# Patient Record
Sex: MALE | Race: ASIAN | NOT HISPANIC OR LATINO | Employment: FULL TIME | URBAN - METROPOLITAN AREA
[De-identification: names, ages, dates, MRNs, and addresses within clinical notes are randomized per-mention and may not be internally consistent; named-entity substitution may affect disease eponyms.]

---

## 2022-06-23 ENCOUNTER — APPOINTMENT (OUTPATIENT)
Dept: CT IMAGING | Facility: HOSPITAL | Age: 32
End: 2022-06-23
Attending: EMERGENCY MEDICINE
Payer: COMMERCIAL

## 2022-06-23 ENCOUNTER — HOSPITAL ENCOUNTER (EMERGENCY)
Facility: HOSPITAL | Age: 32
Discharge: HOME | End: 2022-06-23
Attending: EMERGENCY MEDICINE | Admitting: EMERGENCY MEDICINE
Payer: COMMERCIAL

## 2022-06-23 VITALS
HEART RATE: 72 BPM | DIASTOLIC BLOOD PRESSURE: 105 MMHG | TEMPERATURE: 97.7 F | RESPIRATION RATE: 20 BRPM | SYSTOLIC BLOOD PRESSURE: 138 MMHG | HEIGHT: 62 IN | OXYGEN SATURATION: 98 % | BODY MASS INDEX: 33.13 KG/M2 | WEIGHT: 180 LBS

## 2022-06-23 DIAGNOSIS — K29.00 OTHER ACUTE GASTRITIS, PRESENCE OF BLEEDING UNSPECIFIED: ICD-10-CM

## 2022-06-23 LAB
ALBUMIN SERPL-MCNC: 5.6 G/DL (ref 3.5–5)
ALP SERPL-CCNC: 43 U/L (ref 45–120)
ALT SERPL W P-5'-P-CCNC: 112 U/L (ref 0–45)
ANION GAP SERPL CALCULATED.3IONS-SCNC: 12 MMOL/L (ref 5–18)
AST SERPL W P-5'-P-CCNC: 40 U/L (ref 0–40)
BASOPHILS # BLD AUTO: 0.1 10E3/UL (ref 0–0.2)
BASOPHILS NFR BLD AUTO: 0 %
BILIRUB SERPL-MCNC: 2.4 MG/DL (ref 0–1)
BUN SERPL-MCNC: 20 MG/DL (ref 8–22)
CALCIUM SERPL-MCNC: 10.9 MG/DL (ref 8.5–10.5)
CHLORIDE BLD-SCNC: 103 MMOL/L (ref 98–107)
CO2 SERPL-SCNC: 24 MMOL/L (ref 22–31)
CREAT SERPL-MCNC: 1.12 MG/DL (ref 0.7–1.3)
EOSINOPHIL # BLD AUTO: 0.1 10E3/UL (ref 0–0.7)
EOSINOPHIL NFR BLD AUTO: 0 %
ERYTHROCYTE [DISTWIDTH] IN BLOOD BY AUTOMATED COUNT: 12.2 % (ref 10–15)
GFR SERPL CREATININE-BSD FRML MDRD: 89 ML/MIN/1.73M2
GLUCOSE BLD-MCNC: 121 MG/DL (ref 70–125)
HCT VFR BLD AUTO: 57.1 % (ref 40–53)
HGB BLD-MCNC: 19.4 G/DL (ref 13.3–17.7)
IMM GRANULOCYTES # BLD: 0 10E3/UL
IMM GRANULOCYTES NFR BLD: 0 %
LIPASE SERPL-CCNC: 29 U/L (ref 0–52)
LYMPHOCYTES # BLD AUTO: 1.9 10E3/UL (ref 0.8–5.3)
LYMPHOCYTES NFR BLD AUTO: 14 %
MCH RBC QN AUTO: 29.8 PG (ref 26.5–33)
MCHC RBC AUTO-ENTMCNC: 34 G/DL (ref 31.5–36.5)
MCV RBC AUTO: 88 FL (ref 78–100)
MONOCYTES # BLD AUTO: 0.8 10E3/UL (ref 0–1.3)
MONOCYTES NFR BLD AUTO: 6 %
NEUTROPHILS # BLD AUTO: 10.9 10E3/UL (ref 1.6–8.3)
NEUTROPHILS NFR BLD AUTO: 80 %
NRBC # BLD AUTO: 0 10E3/UL
NRBC BLD AUTO-RTO: 0 /100
PLATELET # BLD AUTO: 217 10E3/UL (ref 150–450)
POTASSIUM BLD-SCNC: 4.1 MMOL/L (ref 3.5–5)
PROT SERPL-MCNC: 8.9 G/DL (ref 6–8)
RBC # BLD AUTO: 6.51 10E6/UL (ref 4.4–5.9)
SODIUM SERPL-SCNC: 139 MMOL/L (ref 136–145)
WBC # BLD AUTO: 13.7 10E3/UL (ref 4–11)

## 2022-06-23 PROCEDURE — 258N000003 HC RX IP 258 OP 636: Performed by: EMERGENCY MEDICINE

## 2022-06-23 PROCEDURE — 96361 HYDRATE IV INFUSION ADD-ON: CPT

## 2022-06-23 PROCEDURE — 96375 TX/PRO/DX INJ NEW DRUG ADDON: CPT

## 2022-06-23 PROCEDURE — 80053 COMPREHEN METABOLIC PANEL: CPT | Performed by: EMERGENCY MEDICINE

## 2022-06-23 PROCEDURE — 36415 COLL VENOUS BLD VENIPUNCTURE: CPT | Performed by: EMERGENCY MEDICINE

## 2022-06-23 PROCEDURE — 74176 CT ABD & PELVIS W/O CONTRAST: CPT

## 2022-06-23 PROCEDURE — 250N000011 HC RX IP 250 OP 636: Performed by: EMERGENCY MEDICINE

## 2022-06-23 PROCEDURE — 99285 EMERGENCY DEPT VISIT HI MDM: CPT | Mod: 25

## 2022-06-23 PROCEDURE — C9113 INJ PANTOPRAZOLE SODIUM, VIA: HCPCS | Performed by: EMERGENCY MEDICINE

## 2022-06-23 PROCEDURE — 96374 THER/PROPH/DIAG INJ IV PUSH: CPT | Mod: 59

## 2022-06-23 PROCEDURE — 83690 ASSAY OF LIPASE: CPT | Performed by: EMERGENCY MEDICINE

## 2022-06-23 PROCEDURE — 85025 COMPLETE CBC W/AUTO DIFF WBC: CPT | Performed by: EMERGENCY MEDICINE

## 2022-06-23 RX ORDER — ONDANSETRON 2 MG/ML
4 INJECTION INTRAMUSCULAR; INTRAVENOUS ONCE
Status: COMPLETED | OUTPATIENT
Start: 2022-06-23 | End: 2022-06-23

## 2022-06-23 RX ORDER — PANTOPRAZOLE SODIUM 40 MG/1
40 TABLET, DELAYED RELEASE ORAL DAILY
Qty: 30 TABLET | Refills: 0 | Status: SHIPPED | OUTPATIENT
Start: 2022-06-23 | End: 2022-07-23

## 2022-06-23 RX ORDER — ONDANSETRON 4 MG/1
4 TABLET, ORALLY DISINTEGRATING ORAL EVERY 8 HOURS PRN
Qty: 10 TABLET | Refills: 0 | Status: SHIPPED | OUTPATIENT
Start: 2022-06-23 | End: 2022-06-26

## 2022-06-23 RX ORDER — KETOROLAC TROMETHAMINE 15 MG/ML
15 INJECTION, SOLUTION INTRAMUSCULAR; INTRAVENOUS ONCE
Status: COMPLETED | OUTPATIENT
Start: 2022-06-23 | End: 2022-06-23

## 2022-06-23 RX ADMIN — SODIUM CHLORIDE 500 ML: 9 INJECTION, SOLUTION INTRAVENOUS at 04:53

## 2022-06-23 RX ADMIN — KETOROLAC TROMETHAMINE 15 MG: 15 INJECTION, SOLUTION INTRAMUSCULAR; INTRAVENOUS at 06:51

## 2022-06-23 RX ADMIN — ONDANSETRON 4 MG: 2 INJECTION INTRAMUSCULAR; INTRAVENOUS at 04:52

## 2022-06-23 RX ADMIN — PANTOPRAZOLE SODIUM 40 MG: 40 INJECTION, POWDER, FOR SOLUTION INTRAVENOUS at 04:53

## 2022-06-23 ASSESSMENT — ENCOUNTER SYMPTOMS
ABDOMINAL PAIN: 1
DIARRHEA: 0
DIFFICULTY URINATING: 0
VOMITING: 0
NAUSEA: 1
DYSURIA: 0
CONSTIPATION: 0
HEMATURIA: 0

## 2022-06-23 NOTE — ED PROVIDER NOTES
EMERGENCY DEPARTMENT ENCOUNTER      NAME: Anastacio Li  AGE: 31 year old male  YOB: 1990  MRN: [id number]  EVALUATION DATE & TIME: 2022  4:27 AM    PCP: No primary care provider on file.    ED PROVIDER: Apurva Fuller M.D.      No chief complaint on file.        FINAL IMPRESSION:  1. Other acute gastritis, presence of bleeding unspecified        MEDICAL DECISION MAKIN:51 AM I met with the patient, obtained history, performed an initial exam, and discussed options and plan for diagnostics and treatment here in the ED.   Pertinent Labs & Imaging studies reviewed. (See chart for details)     Anastacio Li is a 31 year old male who presents with left upper abdominal pain.  Pain wraps around into the back to include the left flank.  Belly is otherwise soft and there is no tenderness on the right side or in the right lower quadrant.  He has had no vomiting.  He is afebrile and aside from some diastolic hypertension, vitals are normal.  Differential diagnosis includes gastritis, viral gastroenteritis, renal colic or pancreatitis.  Suspicion for bacterial process such as cholecystitis or appendicitis as he is not tender in the right side of his abdomen.  He will get medications for pain, fluids and acid blocking medications through the IV.  I will get labs and he will be sent for CT scan to evaluate.    CT scan does not show any evidence for bacterial infection, obstruction or hydronephrosis or ureteral stone.  Labs returned showing a slight elevation in his ALT but no other abnormalities.  CT scan does show fatty liver disease which is likely the cause of this ALT elevation.  The patient was feeling better after the medications given.   He will be discharged with Protonix and Zofran to help manage his symptoms.  We discussed warning signs and indications to return to the emergency department.  He understands these warning signs and will return with any concerns.      PPE worn: n95 mask, goggles.       MEDICATIONS GIVEN IN THE EMERGENCY:  Medications   ondansetron (ZOFRAN) injection 4 mg (4 mg Intravenous Given 6/23/22 0452)   pantoprazole (PROTONIX) IV push injection 40 mg (40 mg Intravenous Given 6/23/22 0453)   0.9% sodium chloride BOLUS (500 mLs Intravenous New Bag 6/23/22 0453)       NEW PRESCRIPTIONS STARTED AT TODAY'S ER VISIT:  New Prescriptions    ONDANSETRON (ZOFRAN ODT) 4 MG ODT TAB    Take 1 tablet (4 mg) by mouth every 8 hours as needed for nausea or vomiting    PANTOPRAZOLE (PROTONIX) 40 MG EC TABLET    Take 1 tablet (40 mg) by mouth daily for 30 doses          =================================================================    HPI    Patient information was obtained from: patient     Use of : Use of : N/A       Anastacio Li is a 31 year old male with no pertinent history on record who presents with abdominal pain.     Patient reports LUQ abdominal pain which began on 6/21 around noon. His pain severely worsened this evening. Patient states his pain comes in intermittent episodes of stabbing pain in his LUQ. Patient presents to the ED after he had 4 episodes of this stabbing pain within the 30 minutes. States the episodes of pain are waking him up from sleep. Associating nausea without vomiting. Also complains of back pain. Reports a history of gallstones, no cholecystectomy. No history of kidney stones. Denies dysuria, hematuria, or difficulty urinating. No changes to his pain with voiding or stooling. No diarrhea. No other complaints or concerns expressed at this time.    REVIEW OF SYSTEMS   Review of Systems   Gastrointestinal: Positive for abdominal pain and nausea. Negative for constipation, diarrhea and vomiting.   Genitourinary: Negative for difficulty urinating, dysuria and hematuria.   All other systems reviewed and are negative.       PAST MEDICAL HISTORY:  History reviewed. No pertinent past medical history.    PAST SURGICAL HISTORY:  History reviewed. No pertinent  "surgical history.    CURRENT MEDICATIONS:    No current facility-administered medications for this encounter.  No current outpatient medications on file.    ALLERGIES:  No Known Allergies    FAMILY HISTORY:  History reviewed. No pertinent family history.    SOCIAL HISTORY:         PHYSICAL EXAM:    Vitals: BP (!) 143/108   Pulse 89   Temp 97.7  F (36.5  C) (Oral)   Resp 20   Ht 1.575 m (5' 2\")   Wt 81.6 kg (180 lb)   SpO2 98%   BMI 32.92 kg/m     General:. Alert and interactive, comfortable appearing.  HENT: Oropharynx without erythema or exudates. MMM.  TMs clear bilaterally.  Eyes: Pupils mid-sized and equally reactive.   Neck: Full AROM.  No midline tenderness to palpation.  Cardiovascular: Regular rate and rhythm. Peripheral pulses 2+ bilaterally.  Chest/Pulmonary: Normal work of breathing. Lung sounds clear and equal throughout, no wheezes or crackles. No chest wall tenderness or deformities.  Abdomen: Soft, nondistended. LUQ tenderness, no RLQ tenderness. No guarding or rebound.  Back/Spine: Left CVA tenderness around to the left upper quadrant.  No midline tenderness  Extremities: Normal ROM of all major joints. No lower extremity edema.   Skin: Warm and dry. Normal skin color.   Neuro: Speech clear. CNs grossly intact. Moves all extremities appropriately. Strength and sensation grossly intact to all extremities.   Psych: Normal affect/mood, cooperative, memory appropriate.     LAB:  All pertinent labs reviewed and interpreted.  Labs Ordered and Resulted from Time of ED Arrival to Time of ED Departure   COMPREHENSIVE METABOLIC PANEL - Abnormal       Result Value    Sodium 139      Potassium 4.1      Chloride 103      Carbon Dioxide (CO2) 24      Anion Gap 12      Urea Nitrogen 20      Creatinine 1.12      Calcium 10.9 (*)     Glucose 121      Alkaline Phosphatase 43 (*)     AST 40       (*)     Protein Total 8.9 (*)     Albumin 5.6 (*)     Bilirubin Total 2.4 (*)     GFR Estimate 89     CBC WITH " PLATELETS AND DIFFERENTIAL - Abnormal    WBC Count 13.7 (*)     RBC Count 6.51 (*)     Hemoglobin 19.4 (*)     Hematocrit 57.1 (*)     MCV 88      MCH 29.8      MCHC 34.0      RDW 12.2      Platelet Count 217      % Neutrophils 80      % Lymphocytes 14      % Monocytes 6      % Eosinophils 0      % Basophils 0      % Immature Granulocytes 0      NRBCs per 100 WBC 0      Absolute Neutrophils 10.9 (*)     Absolute Lymphocytes 1.9      Absolute Monocytes 0.8      Absolute Eosinophils 0.1      Absolute Basophils 0.1      Absolute Immature Granulocytes 0.0      Absolute NRBCs 0.0     LIPASE - Normal    Lipase 29         RADIOLOGY:  Abd/pelvis CT no contrast - Stone Protocol   Final Result   IMPRESSION:    1.  No urinary tract calculi or obstruction. Mild prostatomegaly.      2.  Diffusely fatty infiltrated moderately enlarged liver with focal sparing adjacent to the gallbladder fossa.      3.  Slight left convex lower lumbar curve. Partial sacralization of L5 on the left.                     I, Jeancarlos Eller, am serving as a scribe to document services personally performed by Dr. Apurva Fuller  based on my observation and the provider's statements to me. I, Apurva Fuller MD attest that Jeancarlos Eller is acting in a scribe capacity, has observed my performance of the services and has documented them in accordance with my direction.      Apurva Fuller M.D.  Emergency Medicine  [facility] EMERGENCY DEPARTMENT  [facility] [facility] [address]  [phone]  Dept: [phone]         Apurva Fuller MD  06/23/22 0824

## 2022-06-23 NOTE — ED TRIAGE NOTES
Pt here d/t intermittent ABD pain that started yesterday afternoon while at work. No trauma. Nausea w/o vomit, some diarrhea. LUQ feels better when pushing on it. Pt also c/o pain to bilat lower ABD. Normal voiding. States he has had this pain before and was diagnosed with gastroenteritis.      Triage Assessment     Row Name 06/23/22 0420       Triage Assessment (Adult)    Airway WDL WDL       Respiratory WDL    Respiratory WDL WDL       Cognitive/Neuro/Behavioral WDL    Cognitive/Neuro/Behavioral WDL WDL

## 2022-06-23 NOTE — DISCHARGE INSTRUCTIONS
Use 40 mg Protonix every morning before breakfast to help reduce the acid in your stomach.  Avoid acid producing medications such as ibuprofen and acid producing foods such as citrus fruits, berries, coffee, chocolate, and tomatoes/peppers.    Use Zofran 4 mg every 8 hours as needed for nausea/vomiting.    Start by ingesting only clear liquids such as water, Gatorade, Pedialyte, apple juice, etc.  Once you are able to tolerate these without pain you may advance your diet to gentle soft such as white toast, rice, mashed potatoes, oatmeal, bananas, applesauce, etc.  Once you are able to eat these without pain or nausea, you may advance your diet back to normal.    If you develop a fever greater than 100.5 degrees, vomiting and are unable to tolerate oral hydration, blood in your stool or blood in your vomitus, or pain that is not controlled with prescription medications, please return to the emergency department.

## 2025-05-21 ENCOUNTER — HOSPITAL ENCOUNTER (EMERGENCY)
Facility: CLINIC | Age: 35
Discharge: HOME | End: 2025-05-21
Admitting: PHYSICIAN ASSISTANT
Payer: COMMERCIAL

## 2025-05-21 ENCOUNTER — NURSE TRIAGE (OUTPATIENT)
Dept: NURSING | Facility: CLINIC | Age: 35
End: 2025-05-21
Payer: COMMERCIAL

## 2025-05-21 VITALS
TEMPERATURE: 97.7 F | OXYGEN SATURATION: 99 % | SYSTOLIC BLOOD PRESSURE: 163 MMHG | BODY MASS INDEX: 31.24 KG/M2 | WEIGHT: 169.75 LBS | RESPIRATION RATE: 20 BRPM | DIASTOLIC BLOOD PRESSURE: 103 MMHG | HEART RATE: 56 BPM | HEIGHT: 62 IN

## 2025-05-21 DIAGNOSIS — K29.70 GASTRITIS: ICD-10-CM

## 2025-05-21 DIAGNOSIS — R10.13 ABDOMINAL PAIN, EPIGASTRIC: ICD-10-CM

## 2025-05-21 LAB
ALBUMIN SERPL BCG-MCNC: 4.9 G/DL (ref 3.5–5.2)
ALP SERPL-CCNC: 37 U/L (ref 40–150)
ALT SERPL W P-5'-P-CCNC: 74 U/L (ref 0–70)
ANION GAP SERPL CALCULATED.3IONS-SCNC: 11 MMOL/L (ref 7–15)
AST SERPL W P-5'-P-CCNC: 36 U/L (ref 0–45)
ATRIAL RATE - MUSE: 51 BPM
BASOPHILS # BLD AUTO: 0 10E3/UL (ref 0–0.2)
BASOPHILS NFR BLD AUTO: 0 %
BILIRUB SERPL-MCNC: 1.1 MG/DL
BILIRUBIN DIRECT (ROCHE PRO & PURE): 0.44 MG/DL (ref 0–0.45)
BUN SERPL-MCNC: 15.1 MG/DL (ref 6–20)
CALCIUM SERPL-MCNC: 10 MG/DL (ref 8.8–10.4)
CHLORIDE SERPL-SCNC: 104 MMOL/L (ref 98–107)
CREAT SERPL-MCNC: 1.22 MG/DL (ref 0.67–1.17)
DIASTOLIC BLOOD PRESSURE - MUSE: NORMAL MMHG
EGFRCR SERPLBLD CKD-EPI 2021: 79 ML/MIN/1.73M2
EOSINOPHIL # BLD AUTO: 0.1 10E3/UL (ref 0–0.7)
EOSINOPHIL NFR BLD AUTO: 1 %
ERYTHROCYTE [DISTWIDTH] IN BLOOD BY AUTOMATED COUNT: 12.2 % (ref 10–15)
GLUCOSE SERPL-MCNC: 97 MG/DL (ref 70–99)
HCO3 SERPL-SCNC: 26 MMOL/L (ref 22–29)
HCT VFR BLD AUTO: 46.4 % (ref 40–53)
HGB BLD-MCNC: 15.4 G/DL (ref 13.3–17.7)
IMM GRANULOCYTES # BLD: 0 10E3/UL
IMM GRANULOCYTES NFR BLD: 0 %
INTERPRETATION ECG - MUSE: NORMAL
LIPASE SERPL-CCNC: 57 U/L (ref 13–60)
LYMPHOCYTES # BLD AUTO: 2.5 10E3/UL (ref 0.8–5.3)
LYMPHOCYTES NFR BLD AUTO: 33 %
MAGNESIUM SERPL-MCNC: 2.3 MG/DL (ref 1.7–2.3)
MCH RBC QN AUTO: 29.9 PG (ref 26.5–33)
MCHC RBC AUTO-ENTMCNC: 33.2 G/DL (ref 31.5–36.5)
MCV RBC AUTO: 90 FL (ref 78–100)
MONOCYTES # BLD AUTO: 0.5 10E3/UL (ref 0–1.3)
MONOCYTES NFR BLD AUTO: 6 %
NEUTROPHILS # BLD AUTO: 4.5 10E3/UL (ref 1.6–8.3)
NEUTROPHILS NFR BLD AUTO: 60 %
NRBC # BLD AUTO: 0 10E3/UL
NRBC BLD AUTO-RTO: 0 /100
P AXIS - MUSE: 27 DEGREES
PLATELET # BLD AUTO: 188 10E3/UL (ref 150–450)
POTASSIUM SERPL-SCNC: 4.1 MMOL/L (ref 3.4–5.3)
PR INTERVAL - MUSE: 186 MS
PROT SERPL-MCNC: 7.5 G/DL (ref 6.4–8.3)
QRS DURATION - MUSE: 100 MS
QT - MUSE: 418 MS
QTC - MUSE: 385 MS
R AXIS - MUSE: 54 DEGREES
RBC # BLD AUTO: 5.15 10E6/UL (ref 4.4–5.9)
SODIUM SERPL-SCNC: 141 MMOL/L (ref 135–145)
SYSTOLIC BLOOD PRESSURE - MUSE: NORMAL MMHG
T AXIS - MUSE: 33 DEGREES
VENTRICULAR RATE- MUSE: 51 BPM
WBC # BLD AUTO: 7.6 10E3/UL (ref 4–11)

## 2025-05-21 PROCEDURE — 85025 COMPLETE CBC W/AUTO DIFF WBC: CPT | Performed by: PHYSICIAN ASSISTANT

## 2025-05-21 PROCEDURE — 99284 EMERGENCY DEPT VISIT MOD MDM: CPT

## 2025-05-21 PROCEDURE — 36415 COLL VENOUS BLD VENIPUNCTURE: CPT | Performed by: PHYSICIAN ASSISTANT

## 2025-05-21 PROCEDURE — 93005 ELECTROCARDIOGRAM TRACING: CPT | Performed by: PHYSICIAN ASSISTANT

## 2025-05-21 PROCEDURE — 82248 BILIRUBIN DIRECT: CPT | Performed by: PHYSICIAN ASSISTANT

## 2025-05-21 PROCEDURE — 82310 ASSAY OF CALCIUM: CPT | Performed by: PHYSICIAN ASSISTANT

## 2025-05-21 PROCEDURE — 83735 ASSAY OF MAGNESIUM: CPT | Performed by: PHYSICIAN ASSISTANT

## 2025-05-21 PROCEDURE — 82977 ASSAY OF GGT: CPT | Performed by: PHYSICIAN ASSISTANT

## 2025-05-21 PROCEDURE — 83690 ASSAY OF LIPASE: CPT | Performed by: PHYSICIAN ASSISTANT

## 2025-05-21 ASSESSMENT — COLUMBIA-SUICIDE SEVERITY RATING SCALE - C-SSRS
2. HAVE YOU ACTUALLY HAD ANY THOUGHTS OF KILLING YOURSELF IN THE PAST MONTH?: NO
1. IN THE PAST MONTH, HAVE YOU WISHED YOU WERE DEAD OR WISHED YOU COULD GO TO SLEEP AND NOT WAKE UP?: NO
6. HAVE YOU EVER DONE ANYTHING, STARTED TO DO ANYTHING, OR PREPARED TO DO ANYTHING TO END YOUR LIFE?: NO

## 2025-05-21 NOTE — TELEPHONE ENCOUNTER
Nurse Triage SBAR    Situation: Abdominal pain.     Background: Patient calling. Pt is calling to schedule an appt for his provider. He states he is having reoccurring abdominal issues. Last week his pain was severe.     Assessment: He gets stomach pain from time to time. Usually the pain is sharp. Last Thursday the pain started up again to the point of effecting his ability to work. Occasional nausea. His pain is currently 3-4/10, earlier the pain was about 6-7/10. Since noon the pain has been constantly present. The pain is in his upper left abdomen.     Protocol Recommended Disposition: Emergency Department    Recommendation: According to the protocol, Patient should go to the ED now. Advised Patient that the patient needs to go to the ED now. Care advice given. Patient verbalizes understanding and agrees with plan of care. Reviewed concerning symptoms and when to call 911.    Cynthia Ly RN Nursing Advisor 5/21/2025 6:14 PM     Reason for Disposition   [1] Pain lasts > 10 minutes AND [2] age > 35 AND [3] at least one cardiac risk factor (e.g., diabetes, high cholesterol, hypertension, obesity, smoker or strong family history of heart disease)    Additional Information   Negative: SEVERE difficulty breathing (e.g., struggling for each breath, speaks in single words)   Negative: Shock suspected (e.g., cold/pale/clammy skin, too weak to stand, low BP, rapid pulse)   Negative: Difficult to awaken or acting confused (e.g., disoriented, slurred speech)   Negative: Passed out (i.e., lost consciousness, collapsed and was not responding)   Negative: Visible sweat on face or sweat dripping down face   Negative: Sounds like a life-threatening emergency to the triager   Negative: Followed an abdomen (stomach) injury   Negative: Chest pain   Negative: [1] Abdominal pain AND [2] pregnant < 20 weeks   Negative: [1] Abdominal pain AND [2] pregnant 20 or more weeks   Negative: [1] Abdominal pain AND [2] postpartum (from 0 to 6  weeks after delivery)   Negative: Abdomen bloating or swelling are main symptoms   Negative: [1] SEVERE pain (e.g., excruciating) AND [2] present > 1 hour   Negative: [1] Pain lasts > 10 minutes AND [2] age > 50   Negative: [1] Pain lasts > 10 minutes AND [2] age > 40 AND [3] associated chest, arm, neck, upper back or jaw pain    Protocols used: Abdominal Pain - Upper-A-AH     3

## 2025-05-22 LAB — GGT SERPL-CCNC: 61 U/L (ref 8–61)

## 2025-05-22 NOTE — ED PROVIDER NOTES
EMERGENCY DEPARTMENT ENCOUNTER      NAME: Anastacio Li  AGE: 34 year old male  YOB: 1990  MRN: [id number]  EVALUATION DATE & TIME: No admission date for patient encounter.    PCP: Madison Harden    ED PROVIDER: Tramaine Caceres PA-C      Chief Complaint   Patient presents with    Abdominal Pain         FINAL IMPRESSION:  1. Gastritis    2. Abdominal pain, epigastric          ED COURSE & MEDICAL DECISION MAKING:    Pertinent Labs & Imaging studies reviewed. (See chart for details)  7:26 PM I met the patient and performed my initial interview and exam.   8:38 PM patient updated on laboratory results here, plan for discharge    34 year old male presents to the Emergency Department for evaluation of epigastric abdominal pain.     ED Course as of 05/21/25 2039   Wed May 21, 2025   1939 Patient is a 34-year-old male, no significant past medical history, presents emergency department for evaluation of worsening chronic abdominal pain.  Patient reports a chronic history of upper abdominal pain that intermittently flares up.  He reports some sharp sensation in his abdomen, as well as some burning pain.  He tried to call his primary care doctor and was directed into the emergency department for further assessment.  Vitally stable on arrival here, not febrile, tachycardic or tachypneic.  On exam, he has mild midepigastric abdominal discomfort, no CVA tenderness.  He denies significant alcohol intake.  He denies chest pain or shortness of breath.  Does report a family history of cardiac problems, was told by the nurse triage line he needs to be evaluated for cardiac etiology, however denies any chest pain or shortness of breath here.  He is PERC negative.  Laboratory workup initiated including CBC, BMP, hepatic, lipase, magnesium.  Differential includes cholecystitis, choledocholithiasis, pancreatitis, gastritis, less likely atypical ACS.  Will obtain EKG and basic labs here for assessment.   2035     Patient  "seen and reevaluated here in the emergency department, updated on laboratory studies, no evidence of acute abnormalities.  Patient will be discharged at this time, suspect some gastritis, recommended to continue to take omeprazole at home, will follow-up with his primary care doctor for likely GI referral.       Medical Decision Making  I reviewed the EMR: Outpatient Record: Outpatient nurse triage line call from today, outpatient ED visit on 06/23/2022  Discharge. No recommendations on prescription strength medication(s). N/A.    MIPS (CTPE, Dental pain, Hager, Sinusitis, Asthma/COPD, Head Trauma): Not Applicable    SEPSIS: None        At the conclusion of the encounter I discussed the results of all of the tests and the disposition. The questions were answered. The patient or family acknowledged understanding and was agreeable with the care plan.       0 minutes of critical care time     MEDICATIONS GIVEN IN THE EMERGENCY:  Medications - No data to display    NEW PRESCRIPTIONS STARTED AT TODAY'S ER VISIT  New Prescriptions    No medications on file          =================================================================    HPI    Patient information was obtained from: patient    Use of : N/A        Anastacio Li is a 34 year old male with no pertinent history on record who presents to this ED by private vehicle for evaluation of abdominal pain.    Patient reports chronic intermittent upper abdominal pain since grade school, that he describes as \"needles\", and that has been getting worse for the past few years. He states he has been experiencing frequent episodes since 5/15, that went away for a few days and returned today. He tried to make an appointment with his PCP but was told to call nurse triage line. After calling triage, he was recommended to go to the ED for further evaluation due to family history of heart conditions. Patient denies chest pain and does not endorse any heart conditions himself. " States he deals with GERD. Denies vomiting, diarrhea, fever. Only drinks alcohol occasionally. There were no other concerns/complaints at this time.     PAST MEDICAL HISTORY:  No past medical history on file.    PAST SURGICAL HISTORY:  No past surgical history on file.        CURRENT MEDICATIONS:    traZODone (DESYREL) 50 MG tablet         ALLERGIES:  No Known Allergies    FAMILY HISTORY:  Family History   Problem Relation Age of Onset    Schizophrenia Mother     Hypertension Father     Gout Father     Cerebrovascular Disease Father         passed from CVA in 50's    Gout Brother     Gout Brother        SOCIAL HISTORY:   Social History     Socioeconomic History    Marital status: Single   Tobacco Use    Smoking status: Never     Passive exposure: Never    Smokeless tobacco: Never   Vaping Use    Vaping status: Never Used   Substance and Sexual Activity    Alcohol use: Yes     Comment: 2-3 times a week.    Drug use: Yes     Types: Marijuana     Comment: edibles    Sexual activity: Not Currently     Social Drivers of Health     Financial Resource Strain: Low Risk  (4/18/2025)    Financial Resource Strain     Within the past 12 months, have you or your family members you live with been unable to get utilities (heat, electricity) when it was really needed?: No   Food Insecurity: Low Risk  (4/18/2025)    Food Insecurity     Within the past 12 months, did you worry that your food would run out before you got money to buy more?: No     Within the past 12 months, did the food you bought just not last and you didn t have money to get more?: No   Transportation Needs: Low Risk  (4/18/2025)    Transportation Needs     Within the past 12 months, has lack of transportation kept you from medical appointments, getting your medicines, non-medical meetings or appointments, work, or from getting things that you need?: No   Physical Activity: Unknown (4/18/2025)    Exercise Vital Sign     Days of Exercise per Week: 3 days   Stress:  "Stress Concern Present (4/18/2025)    [employer] - Occupational Stress Questionnaire     Feeling of Stress : Rather much   Social Connections: Unknown (4/18/2025)    Social Connection and Isolation Panel [NHANES]     Frequency of Social Gatherings with Friends and Family: Once a week   Interpersonal Safety: Low Risk  (4/18/2025)    Interpersonal Safety     Do you feel physically and emotionally safe where you currently live?: Yes     Within the past 12 months, have you been hit, slapped, kicked or otherwise physically hurt by someone?: No     Within the past 12 months, have you been humiliated or emotionally abused in other ways by your partner or ex-partner?: No   Housing Stability: Low Risk  (4/18/2025)    Housing Stability     Do you have housing? : Yes     Are you worried about losing your housing?: No       VITALS:  BP (!) 163/103   Pulse 56   Temp 97.7  F (36.5  C) (Temporal)   Resp 20   Ht 1.575 m (5' 2\")   Wt 77 kg (169 lb 12.1 oz)   SpO2 99%   BMI 31.05 kg/m      PHYSICAL EXAM    Physical Exam  Vitals and nursing note reviewed.   Constitutional:       General: He is not in acute distress.     Appearance: Normal appearance. He is normal weight. He is not toxic-appearing or diaphoretic.   HENT:      Right Ear: External ear normal.      Left Ear: External ear normal.   Eyes:      Conjunctiva/sclera: Conjunctivae normal.   Cardiovascular:      Rate and Rhythm: Normal rate and regular rhythm.      Heart sounds: Normal heart sounds.   Pulmonary:      Effort: Pulmonary effort is normal.   Abdominal:      General: There is no distension.      Palpations: Abdomen is soft.      Tenderness: There is no abdominal tenderness.      Comments: Minimal abdominal discomfort to the upper quadrant, epigastric area, no specific focal area of tenderness.   Neurological:      General: No focal deficit present.      Mental Status: He is alert. Mental status is at baseline.           LAB:  All " pertinent labs reviewed and interpreted.  Labs Ordered and Resulted from Time of ED Arrival to Time of ED Departure   BASIC METABOLIC PANEL - Abnormal       Result Value    Sodium 141      Potassium 4.1      Chloride 104      Carbon Dioxide (CO2) 26      Anion Gap 11      Urea Nitrogen 15.1      Creatinine 1.22 (*)     GFR Estimate 79      Calcium 10.0      Glucose 97     HEPATIC FUNCTION PANEL - Abnormal    Protein Total 7.5      Albumin 4.9      Bilirubin Total 1.1      Alkaline Phosphatase 37 (*)     AST 36      ALT 74 (*)     Bilirubin Direct 0.44     LIPASE - Normal    Lipase 57     MAGNESIUM - Normal    Magnesium 2.3     CBC WITH PLATELETS AND DIFFERENTIAL    WBC Count 7.6      RBC Count 5.15      Hemoglobin 15.4      Hematocrit 46.4      MCV 90      MCH 29.9      MCHC 33.2      RDW 12.2      Platelet Count 188      % Neutrophils 60      % Lymphocytes 33      % Monocytes 6      % Eosinophils 1      % Basophils 0      % Immature Granulocytes 0      NRBCs per 100 WBC 0      Absolute Neutrophils 4.5      Absolute Lymphocytes 2.5      Absolute Monocytes 0.5      Absolute Eosinophils 0.1      Absolute Basophils 0.0      Absolute Immature Granulocytes 0.0      Absolute NRBCs 0.0     GGT       RADIOLOGY:  Reviewed all pertinent imaging. Please see official radiology report.  No orders to display       EKG:    Performed at: 1937    Impression: Sinus bradycardia    Rate: 51  Rhythm: Sinus   Axis: 27 54 33  [place] Interval: 186  QRS Interval: 100  QTc Interval: 385  ST Changes: No ST elevation or depression  Comparison: No previous    I have reviewed and interpreted the EKG(s) documented above along with Dr. Villaseñor, ED MD.    PROCEDURES:   None.       ISandy, am serving as a scribe to document services personally performed by Tramaine Caceres PA-C, based on my observation and the provider's statements to me. ITramaine PA-C, attest that Sandy Allen is acting in a scribe capacity, has observed my  performance of the services and has documented them in accordance with my direction.    Tramaine Caceres PA-C  Emergency Medicine  [facility] EMERGENCY ROOM  [address] [facility] [address]  [phone]  Dept: [phone]       Tramaine Caceres PA-C  05/21/25 2035

## 2025-05-22 NOTE — DISCHARGE INSTRUCTIONS
You are seen here in the emergency department for evaluation of midepigastric abdominal pain, your laboratory studies do not show any evidence of intra-abdominal infection, electrolyte abnormality, or liver dysfunction.  I suspect this is likely secondary to some acid reflux.  I would recommend starting Prilosec or famotidine over-the-counter for the next 14 days, follow-up with your primary care doctor for further assessment.  Your EKG here does not show any evidence of cardiac abnormalities.

## 2025-05-22 NOTE — ED TRIAGE NOTES
Patient presents to the ED complaining of chronic abdominal pain since grade school.  He states he called he tried to make an appointment with his primary but was told he needs to come to the ED for a cardiac workup due to family history of diabetes and high blood pressure.  He denies chest pain and shortness of breath.  Abdominal pain is 3/10 at this time.  Denies nausea and vomiting.     Triage Assessment (Adult)       Row Name 05/21/25 3684          Triage Assessment    Airway WDL WDL        Respiratory WDL    Respiratory WDL WDL        Skin Circulation/Temperature WDL    Skin Circulation/Temperature WDL WDL        Cardiac WDL    Cardiac WDL WDL        Peripheral/Neurovascular WDL    Peripheral Neurovascular WDL WDL        Cognitive/Neuro/Behavioral WDL    Cognitive/Neuro/Behavioral WDL WDL        [place] Coma Scale    Best Eye Response 4-->(E4) spontaneous     Best Motor Response 6-->(M6) obeys commands     Best Verbal Response 5-->(V5) oriented     [place] Coma Scale Score 15     Assessment Qualifiers patient not sedated/intubated;no eye obstruction present

## 2025-05-31 ENCOUNTER — APPOINTMENT (OUTPATIENT)
Dept: ULTRASOUND IMAGING | Facility: CLINIC | Age: 35
End: 2025-05-31
Attending: EMERGENCY MEDICINE
Payer: COMMERCIAL

## 2025-05-31 ENCOUNTER — HOSPITAL ENCOUNTER (OUTPATIENT)
Facility: CLINIC | Age: 35
Setting detail: OBSERVATION
Discharge: HOME | End: 2025-06-01
Attending: EMERGENCY MEDICINE | Admitting: FAMILY MEDICINE
Payer: COMMERCIAL

## 2025-05-31 ENCOUNTER — APPOINTMENT (OUTPATIENT)
Dept: CT IMAGING | Facility: CLINIC | Age: 35
End: 2025-05-31
Attending: EMERGENCY MEDICINE
Payer: COMMERCIAL

## 2025-05-31 DIAGNOSIS — K81.0 ACUTE CHOLECYSTITIS: Primary | ICD-10-CM

## 2025-05-31 PROBLEM — R73.03 PREDIABETES: Status: ACTIVE | Noted: 2025-05-31

## 2025-05-31 PROBLEM — G47.00 INSOMNIA: Status: ACTIVE | Noted: 2025-05-31

## 2025-05-31 PROBLEM — R73.03 PREDIABETES: Status: ACTIVE | Noted: 2025-04-01

## 2025-05-31 PROBLEM — K21.9 GERD (GASTROESOPHAGEAL REFLUX DISEASE): Status: ACTIVE | Noted: 2025-05-31

## 2025-05-31 LAB
ALBUMIN SERPL BCG-MCNC: 5.2 G/DL (ref 3.5–5.2)
ALBUMIN UR-MCNC: NEGATIVE MG/DL
ALP SERPL-CCNC: 41 U/L (ref 40–150)
ALT SERPL W P-5'-P-CCNC: 77 U/L (ref 0–70)
ANION GAP SERPL CALCULATED.3IONS-SCNC: 15 MMOL/L (ref 7–15)
APPEARANCE UR: CLEAR
AST SERPL W P-5'-P-CCNC: 37 U/L (ref 0–45)
BASOPHILS # BLD AUTO: 0 10E3/UL (ref 0–0.2)
BASOPHILS NFR BLD AUTO: 0 %
BILIRUB SERPL-MCNC: 1 MG/DL
BILIRUB UR QL STRIP: NEGATIVE
BILIRUBIN DIRECT (ROCHE PRO & PURE): 0.26 MG/DL (ref 0–0.45)
BUN SERPL-MCNC: 17.5 MG/DL (ref 6–20)
CALCIUM SERPL-MCNC: 10.5 MG/DL (ref 8.8–10.4)
CHLORIDE SERPL-SCNC: 102 MMOL/L (ref 98–107)
COLOR UR AUTO: COLORLESS
CREAT SERPL-MCNC: 1.12 MG/DL (ref 0.67–1.17)
EGFRCR SERPLBLD CKD-EPI 2021: 87 ML/MIN/1.73M2
EOSINOPHIL # BLD AUTO: 0 10E3/UL (ref 0–0.7)
EOSINOPHIL NFR BLD AUTO: 0 %
ERYTHROCYTE [DISTWIDTH] IN BLOOD BY AUTOMATED COUNT: 12.6 % (ref 10–15)
GLUCOSE SERPL-MCNC: 130 MG/DL (ref 70–99)
GLUCOSE UR STRIP-MCNC: NEGATIVE MG/DL
HCO3 SERPL-SCNC: 26 MMOL/L (ref 22–29)
HCT VFR BLD AUTO: 51.3 % (ref 40–53)
HGB BLD-MCNC: 17.1 G/DL (ref 13.3–17.7)
HGB UR QL STRIP: NEGATIVE
IMM GRANULOCYTES # BLD: 0 10E3/UL
IMM GRANULOCYTES NFR BLD: 0 %
KETONES UR STRIP-MCNC: 5 MG/DL
LACTATE SERPL-SCNC: 1.7 MMOL/L (ref 0.7–2)
LEUKOCYTE ESTERASE UR QL STRIP: NEGATIVE
LIPASE SERPL-CCNC: 60 U/L (ref 13–60)
LYMPHOCYTES # BLD AUTO: 1.6 10E3/UL (ref 0.8–5.3)
LYMPHOCYTES NFR BLD AUTO: 16 %
MCH RBC QN AUTO: 29.4 PG (ref 26.5–33)
MCHC RBC AUTO-ENTMCNC: 33.3 G/DL (ref 31.5–36.5)
MCV RBC AUTO: 88 FL (ref 78–100)
MONOCYTES # BLD AUTO: 0.4 10E3/UL (ref 0–1.3)
MONOCYTES NFR BLD AUTO: 4 %
NEUTROPHILS # BLD AUTO: 8.1 10E3/UL (ref 1.6–8.3)
NEUTROPHILS NFR BLD AUTO: 80 %
NITRATE UR QL: NEGATIVE
NRBC # BLD AUTO: 0 10E3/UL
NRBC BLD AUTO-RTO: 0 /100
PH UR STRIP: 7 [PH] (ref 5–7)
PLATELET # BLD AUTO: 190 10E3/UL (ref 150–450)
POTASSIUM SERPL-SCNC: 3.7 MMOL/L (ref 3.4–5.3)
PROT SERPL-MCNC: 8 G/DL (ref 6.4–8.3)
RBC # BLD AUTO: 5.81 10E6/UL (ref 4.4–5.9)
RBC URINE: <1 /HPF
SODIUM SERPL-SCNC: 143 MMOL/L (ref 135–145)
SP GR UR STRIP: 1.01 (ref 1–1.03)
UROBILINOGEN UR STRIP-MCNC: <2 MG/DL
WBC # BLD AUTO: 10.2 10E3/UL (ref 4–11)
WBC URINE: <1 /HPF

## 2025-05-31 PROCEDURE — 83605 ASSAY OF LACTIC ACID: CPT | Performed by: EMERGENCY MEDICINE

## 2025-05-31 PROCEDURE — 36415 COLL VENOUS BLD VENIPUNCTURE: CPT | Performed by: EMERGENCY MEDICINE

## 2025-05-31 PROCEDURE — 96375 TX/PRO/DX INJ NEW DRUG ADDON: CPT

## 2025-05-31 PROCEDURE — 250N000013 HC RX MED GY IP 250 OP 250 PS 637: Performed by: FAMILY MEDICINE

## 2025-05-31 PROCEDURE — 76705 ECHO EXAM OF ABDOMEN: CPT

## 2025-05-31 PROCEDURE — 258N000003 HC RX IP 258 OP 636: Performed by: FAMILY MEDICINE

## 2025-05-31 PROCEDURE — 82247 BILIRUBIN TOTAL: CPT | Performed by: EMERGENCY MEDICINE

## 2025-05-31 PROCEDURE — 82947 ASSAY GLUCOSE BLOOD QUANT: CPT | Performed by: EMERGENCY MEDICINE

## 2025-05-31 PROCEDURE — G0378 HOSPITAL OBSERVATION PER HR: HCPCS

## 2025-05-31 PROCEDURE — 96365 THER/PROPH/DIAG IV INF INIT: CPT

## 2025-05-31 PROCEDURE — 81001 URINALYSIS AUTO W/SCOPE: CPT | Performed by: EMERGENCY MEDICINE

## 2025-05-31 PROCEDURE — 250N000011 HC RX IP 250 OP 636: Performed by: EMERGENCY MEDICINE

## 2025-05-31 PROCEDURE — 71275 CT ANGIOGRAPHY CHEST: CPT

## 2025-05-31 PROCEDURE — 85025 COMPLETE CBC W/AUTO DIFF WBC: CPT | Performed by: EMERGENCY MEDICINE

## 2025-05-31 PROCEDURE — 83690 ASSAY OF LIPASE: CPT | Performed by: EMERGENCY MEDICINE

## 2025-05-31 PROCEDURE — 96361 HYDRATE IV INFUSION ADD-ON: CPT

## 2025-05-31 PROCEDURE — 99285 EMERGENCY DEPT VISIT HI MDM: CPT | Mod: 25

## 2025-05-31 PROCEDURE — 258N000003 HC RX IP 258 OP 636: Performed by: EMERGENCY MEDICINE

## 2025-05-31 PROCEDURE — 99223 1ST HOSP IP/OBS HIGH 75: CPT | Performed by: FAMILY MEDICINE

## 2025-05-31 RX ORDER — ONDANSETRON 4 MG/1
4 TABLET, ORALLY DISINTEGRATING ORAL EVERY 6 HOURS PRN
Status: DISCONTINUED | OUTPATIENT
Start: 2025-05-31 | End: 2025-06-01 | Stop reason: HOSPADM

## 2025-05-31 RX ORDER — HYDRALAZINE HYDROCHLORIDE 10 MG/1
10 TABLET, FILM COATED ORAL EVERY 4 HOURS PRN
Status: DISCONTINUED | OUTPATIENT
Start: 2025-05-31 | End: 2025-06-01 | Stop reason: HOSPADM

## 2025-05-31 RX ORDER — PROCHLORPERAZINE MALEATE 10 MG
10 TABLET ORAL EVERY 6 HOURS PRN
Status: DISCONTINUED | OUTPATIENT
Start: 2025-05-31 | End: 2025-06-01 | Stop reason: HOSPADM

## 2025-05-31 RX ORDER — IOPAMIDOL 755 MG/ML
90 INJECTION, SOLUTION INTRAVASCULAR ONCE
Status: COMPLETED | OUTPATIENT
Start: 2025-05-31 | End: 2025-05-31

## 2025-05-31 RX ORDER — HYDRALAZINE HYDROCHLORIDE 20 MG/ML
10 INJECTION INTRAMUSCULAR; INTRAVENOUS EVERY 4 HOURS PRN
Status: DISCONTINUED | OUTPATIENT
Start: 2025-05-31 | End: 2025-06-01 | Stop reason: HOSPADM

## 2025-05-31 RX ORDER — CALCIUM CARBONATE 500 MG/1
1 TABLET, CHEWABLE ORAL 2 TIMES DAILY PRN
COMMUNITY

## 2025-05-31 RX ORDER — ONDANSETRON 2 MG/ML
4 INJECTION INTRAMUSCULAR; INTRAVENOUS ONCE
Status: COMPLETED | OUTPATIENT
Start: 2025-05-31 | End: 2025-05-31

## 2025-05-31 RX ORDER — ONDANSETRON 2 MG/ML
4 INJECTION INTRAMUSCULAR; INTRAVENOUS EVERY 6 HOURS PRN
Status: DISCONTINUED | OUTPATIENT
Start: 2025-05-31 | End: 2025-06-01 | Stop reason: HOSPADM

## 2025-05-31 RX ORDER — POLYETHYLENE GLYCOL 3350 17 G/17G
17 POWDER, FOR SOLUTION ORAL 2 TIMES DAILY PRN
Status: DISCONTINUED | OUTPATIENT
Start: 2025-05-31 | End: 2025-06-01 | Stop reason: HOSPADM

## 2025-05-31 RX ORDER — SIMETHICONE 80 MG
80 TABLET,CHEWABLE ORAL EVERY 6 HOURS PRN
COMMUNITY

## 2025-05-31 RX ORDER — ACETAMINOPHEN 650 MG/1
650 SUPPOSITORY RECTAL EVERY 4 HOURS PRN
Status: DISCONTINUED | OUTPATIENT
Start: 2025-05-31 | End: 2025-06-01 | Stop reason: HOSPADM

## 2025-05-31 RX ORDER — AMOXICILLIN 250 MG
1 CAPSULE ORAL 2 TIMES DAILY PRN
Status: DISCONTINUED | OUTPATIENT
Start: 2025-05-31 | End: 2025-06-01 | Stop reason: HOSPADM

## 2025-05-31 RX ORDER — PANTOPRAZOLE SODIUM 40 MG/1
40 TABLET, DELAYED RELEASE ORAL DAILY
Status: DISCONTINUED | OUTPATIENT
Start: 2025-05-31 | End: 2025-06-01 | Stop reason: HOSPADM

## 2025-05-31 RX ORDER — ACETAMINOPHEN 500 MG
500-1000 TABLET ORAL EVERY 6 HOURS PRN
COMMUNITY

## 2025-05-31 RX ORDER — ACETAMINOPHEN 325 MG/1
650 TABLET ORAL EVERY 4 HOURS PRN
Status: DISCONTINUED | OUTPATIENT
Start: 2025-05-31 | End: 2025-06-01 | Stop reason: HOSPADM

## 2025-05-31 RX ORDER — AMOXICILLIN 250 MG
2 CAPSULE ORAL 2 TIMES DAILY PRN
Status: DISCONTINUED | OUTPATIENT
Start: 2025-05-31 | End: 2025-06-01 | Stop reason: HOSPADM

## 2025-05-31 RX ORDER — SODIUM CHLORIDE, SODIUM LACTATE, POTASSIUM CHLORIDE, CALCIUM CHLORIDE 600; 310; 30; 20 MG/100ML; MG/100ML; MG/100ML; MG/100ML
INJECTION, SOLUTION INTRAVENOUS CONTINUOUS
Status: DISCONTINUED | OUTPATIENT
Start: 2025-05-31 | End: 2025-06-01 | Stop reason: HOSPADM

## 2025-05-31 RX ORDER — HYDROMORPHONE HCL IN WATER/PF 6 MG/30 ML
0.4 PATIENT CONTROLLED ANALGESIA SYRINGE INTRAVENOUS
Status: DISCONTINUED | OUTPATIENT
Start: 2025-05-31 | End: 2025-06-01 | Stop reason: HOSPADM

## 2025-05-31 RX ORDER — HYDROMORPHONE HCL IN WATER/PF 6 MG/30 ML
0.2 PATIENT CONTROLLED ANALGESIA SYRINGE INTRAVENOUS
Status: DISCONTINUED | OUTPATIENT
Start: 2025-05-31 | End: 2025-06-01 | Stop reason: HOSPADM

## 2025-05-31 RX ORDER — PIPERACILLIN SODIUM, TAZOBACTAM SODIUM 3; .375 G/15ML; G/15ML
3.38 INJECTION, POWDER, LYOPHILIZED, FOR SOLUTION INTRAVENOUS ONCE
Status: COMPLETED | OUTPATIENT
Start: 2025-05-31 | End: 2025-05-31

## 2025-05-31 RX ORDER — OXYCODONE HYDROCHLORIDE 5 MG/1
5 TABLET ORAL EVERY 4 HOURS PRN
Status: DISCONTINUED | OUTPATIENT
Start: 2025-05-31 | End: 2025-06-01 | Stop reason: HOSPADM

## 2025-05-31 RX ORDER — OMEPRAZOLE 20 MG/1
20 TABLET, DELAYED RELEASE ORAL DAILY
COMMUNITY

## 2025-05-31 RX ADMIN — SODIUM CHLORIDE 1000 ML: 0.9 INJECTION, SOLUTION INTRAVENOUS at 16:57

## 2025-05-31 RX ADMIN — IOPAMIDOL 90 ML: 755 INJECTION, SOLUTION INTRAVENOUS at 17:15

## 2025-05-31 RX ADMIN — ONDANSETRON 4 MG: 2 INJECTION, SOLUTION INTRAMUSCULAR; INTRAVENOUS at 16:54

## 2025-05-31 RX ADMIN — HYDROMORPHONE HYDROCHLORIDE 1 MG: 1 INJECTION, SOLUTION INTRAMUSCULAR; INTRAVENOUS; SUBCUTANEOUS at 16:54

## 2025-05-31 RX ADMIN — PANTOPRAZOLE SODIUM 40 MG: 40 TABLET, DELAYED RELEASE ORAL at 21:48

## 2025-05-31 RX ADMIN — PIPERACILLIN AND TAZOBACTAM 3.38 G: 3; .375 INJECTION, POWDER, FOR SOLUTION INTRAVENOUS at 20:15

## 2025-05-31 RX ADMIN — SODIUM CHLORIDE, POTASSIUM CHLORIDE, SODIUM LACTATE AND CALCIUM CHLORIDE: 600; 310; 30; 20 INJECTION, SOLUTION INTRAVENOUS at 22:02

## 2025-05-31 ASSESSMENT — ACTIVITIES OF DAILY LIVING (ADL)
ADLS_ACUITY_SCORE: 41
ADLS_ACUITY_SCORE: 48
ADLS_ACUITY_SCORE: 41

## 2025-05-31 NOTE — ED TRIAGE NOTES
Pt reports severe LUQ abd pain radiating to lower back 10/10. +NV. Pain started 1-2 hours ago. Pt is restless and lightheaded in triage. Denies history of kidney stones. Denies surgical history. Afebrile. Pt took TUMS and gasx pta with no relief.     HX: gallstones and gastroenteritis

## 2025-05-31 NOTE — LETTER
[facility] [address]  [address] [address]  Phone: [phone]  Fax: [phone]    June 1, 2025        Anastacio Li  [address]   [place] [address]          To whom it may concern:    RE: Anastacio Li    Please note that patient was hospitalized from 5/31/2025 to 6/1/2025.  Patient should be off of work for the next week.  Okay to return on 6/9/2025.  Should follow-up with primary care provider and/or surgeon if in need of any further work restrictions.    Please contact me for questions or concerns.      Sincerely,          Keaton Rowe MD  6/1/2025  1:04 PM  [facility] Medicine Service  [phone]

## 2025-05-31 NOTE — ED PROVIDER NOTES
EMERGENCY DEPARTMENT ENCOUNTER      NAME: Anastacio Li  AGE: 34 year old male  YOB: 1990  MRN: [id number]  EVALUATION DATE & TIME: 2025  4:33 PM    PCP: Madison Harden    ED PROVIDER: Rocky Mark D.O.      Chief Complaint   Patient presents with    Abdominal Pain    Nausea & Vomiting       FINAL IMPRESSION:  1. Acute cholecystitis        ED COURSE & MEDICAL DECISION MAKIN:43 PM I met with the patient to gather history and to perform my initial exam. I discussed the plan for care while in the Emergency Department.  6:29 PM I rechecked the patient. He is feeling improved.   7:53 PM I spoke with Dr. Grimaldo, general surgery. Plan for admission and cholecystectomy tomorrow.   7:54 PM I updated the patient on plan for admission and surgery. Patient agreeable to plan.        Pertinent Labs & Imaging studies reviewed. (See chart for details)  34 year old male presents to the Emergency Department for evaluation of upper abdominal pain.  Initial differential did include gastritis, cholecystitis, cholelithiasis, choledocholithiasis, pancreatitis, bowel obstruction, volvulus, mesenteric ischemia.  He also had pain radiating to the back with concern for the potential for that could represent aortic dissection.  Therefore patient was sent for CT imaging, which did not show any evidence of aortic dissection, however when I have reviewed the imaging on the CT scan was concerned that his gallbladder appeared quite large, therefore was sent down for ultrasound imaging of the right upper quadrant.  This does show what appears to be acute cholecystitis, and the patient was started on antibiotics.  He is otherwise stable without signs of sepsis, and his pain is under control at this time.  I discussed the patient with the surgeon, who plans to take him to the OR tomorrow.  Discussed with the hospitalist will admit for further management.    Medical Decision Making  I discussed the care with another health care  "provider: General Surgery, Hospitalist  Admit.    MIPS (CTPE, Dental pain, Hager, Sinusitis, Asthma/COPD, Head Trauma): Not Applicable    SEPSIS: None          At the conclusion of the encounter I discussed the results of all of the tests and the disposition. The questions were answered. The patient or family acknowledged understanding and was agreeable with the care plan.        HPI    Patient information was obtained from: Patient and family member    Use of : N/A       Anastacio Li is a 34 year old male with past medical history of cannabis use, gallstones, and gastroenteritis who presents for evaluation of abdominal pain and nausea.     Patient presents with nausea and abdominal pain with associated back \"spasms\" that began 2 hours ago with gradual onset after he came back from hiking. He has had these symptoms before, and was told they were due to gastritis. He has not eaten since the pain started. Patient still has his gallbladder. Patient states he drinks alcohol occasionally, and did drink last night.     Patient denies fever, vomiting, or any other complaints at this time.    Per Chart Review: Patient was seen at [facility] ED on 05/21/2025 for evaluation of abdominal pain. Patient endorsed a history of chronic upper abdominal pain that intermittently flares up. No evidence of acute abnormalities on lab work and EKG. Patient to follow up with PCP for a GI referral.       PAST MEDICAL HISTORY:  Past Medical History:   Diagnosis Date    Anxiety     GERD (gastroesophageal reflux disease) 05/31/2025    Insomnia 05/31/2025    Prediabetes 04/2025       PAST SURGICAL HISTORY:  Past Surgical History:   Procedure Laterality Date    NO PAST SURGERIES           CURRENT MEDICATIONS:    Current Facility-Administered Medications   Medication Dose Route Frequency Provider Last Rate Last Admin    acetaminophen (TYLENOL) tablet 650 mg  650 mg Oral Q4H PRN Keaton Rowe MD        Or    " acetaminophen (TYLENOL) Suppository 650 mg  650 mg Rectal Q4H PRKeaton Mann MD        hydrALAZINE (APRESOLINE) tablet 10 mg  10 mg Oral Q4H PRN Keaton Rowe MD        Or    hydrALAZINE (APRESOLINE) injection 10 mg  10 mg Intravenous Q4H PRKeaton Mann MD        HYDROmorphone (DILAUDID) injection 0.2 mg  0.2 mg Intravenous Q2H PRN Keaton Rowe MD        HYDROmorphone (DILAUDID) injection 0.4 mg  0.4 mg Intravenous Q2H PRKeaton Mann MD        lactated ringers infusion   Intravenous Continuous Keaton Rowe MD        melatonin tablet 5 mg  5 mg Oral At Bedtime PRKeaton Mann MD        ondansetron (ZOFRAN ODT) ODT tab 4 mg  4 mg Oral Q6H PRN Keaton Rowe MD        Or    ondansetron (ZOFRAN) injection 4 mg  4 mg Intravenous Q6H PRN Keaton Rowe MD        oxyCODONE (ROXICODONE) tablet 5 mg  5 mg Oral Q4H PRN Keaton Rowe MD        oxyCODONE IR (ROXICODONE) half-tab 2.5 mg  2.5 mg Oral Q4H PRKeaton Mann MD        pantoprazole (PROTONIX) EC tablet 40 mg  40 mg Oral Daily Keaton Rowe MD        polyethylene glycol (MIRALAX) Packet 17 g  17 g Oral BID PRN Keaton Rowe MD        prochlorperazine (COMPAZINE) injection 10 mg  10 mg Intravenous Q6H PRKeaton Mann MD        Or    prochlorperazine (COMPAZINE) tablet 10 mg  10 mg Oral Q6H PRKeaton Mann MD        senna-docusate (SENOKOT-S/PERICOLACE) 8.6-50 MG per tablet 1 tablet  1 tablet Oral BID PRKeaton Mann MD        Or    senna-docusate (SENOKOT-S/PERICOLACE) 8.6-50 MG per tablet 2 tablet  2 tablet Oral BID PRKeaton Mann MD        traZODone (DESYREL) half-tab 25 mg  25 mg Oral At Bedtime PRKeaton Mann MD         Current Outpatient Medications   Medication Sig Dispense Refill    acetaminophen (TYLENOL) 500 MG tablet Take 500-1,000 mg by mouth every 6 hours as needed for mild pain.      calcium carbonate (TUMS) 500 MG chewable tablet Take 1 chew tab by mouth 2 times  daily as needed for heartburn.      omeprazole (PRILOSEC OTC) 20 MG EC tablet Take 20 mg by mouth daily.      simethicone (MYLICON) 80 MG chewable tablet Take 80 mg by mouth every 6 hours as needed for flatulence or cramping.      traZODone (DESYREL) 50 MG tablet Take 0.5 tablets (25 mg) by mouth at bedtime. 60 tablet 0         ALLERGIES:  No Known Allergies    FAMILY HISTORY:  Family History   Problem Relation Age of Onset    Schizophrenia Mother     Hypertension Father     Gout Father     Cerebrovascular Disease Father 50 - 59        passed from CVA in 50's    Gout Brother     Gout Brother        SOCIAL HISTORY:  Social History     Socioeconomic History    Marital status: Single   Tobacco Use    Smoking status: Never     Passive exposure: Never    Smokeless tobacco: Never   Vaping Use    Vaping status: Never Used   Substance and Sexual Activity    Alcohol use: Yes     Comment: 2-3 times a week.    Drug use: Yes     Types: Marijuana     Comment: edibles    Sexual activity: Not Currently     Social Drivers of Health     Financial Resource Strain: Low Risk  (4/18/2025)    Financial Resource Strain     Within the past 12 months, have you or your family members you live with been unable to get utilities (heat, electricity) when it was really needed?: No   Food Insecurity: Low Risk  (4/18/2025)    Food Insecurity     Within the past 12 months, did you worry that your food would run out before you got money to buy more?: No     Within the past 12 months, did the food you bought just not last and you didn t have money to get more?: No   Transportation Needs: Low Risk  (4/18/2025)    Transportation Needs     Within the past 12 months, has lack of transportation kept you from medical appointments, getting your medicines, non-medical meetings or appointments, work, or from getting things that you need?: No   Physical Activity: Unknown (4/18/2025)    Exercise Vital Sign     Days of Exercise per Week: 3 days   Stress: Stress  Concern Present (4/18/2025)    [employer] [place] [employer] - Occupational Stress Questionnaire     Feeling of Stress : Rather much   Social Connections: Unknown (4/18/2025)    Social Connection and Isolation Panel [NHANES]     Frequency of Social Gatherings with Friends and Family: Once a week   Interpersonal Safety: Low Risk  (4/18/2025)    Interpersonal Safety     Do you feel physically and emotionally safe where you currently live?: Yes     Within the past 12 months, have you been hit, slapped, kicked or otherwise physically hurt by someone?: No     Within the past 12 months, have you been humiliated or emotionally abused in other ways by your partner or ex-partner?: No   Housing Stability: Low Risk  (4/18/2025)    Housing Stability     Do you have housing? : Yes     Are you worried about losing your housing?: No       VITALS:  Patient Vitals for the past 24 hrs:   BP Temp Temp src Pulse Resp SpO2 Height Weight   05/31/25 2031 (!) 156/116 -- -- 74 -- 99 % -- --   05/31/25 2016 (!) 163/124 -- -- 82 -- 100 % -- --   05/31/25 2001 (!) 167/115 -- -- 70 -- 99 % -- --   05/31/25 1946 (!) 156/117 -- -- 72 -- 100 % -- --   05/31/25 1931 (!) 157/116 -- -- 73 -- 99 % -- --   05/31/25 1916 (!) 165/122 -- -- 74 -- 100 % -- --   05/31/25 1800 -- -- -- 68 -- 100 % -- --   05/31/25 1706 (!) 153/89 -- -- 61 -- -- -- --   05/31/25 1700 (!) 150/83 -- -- 68 -- -- -- --   05/31/25 1638 (!) 166/110 97.1  F (36.2  C) Temporal 70 30 98 % 1.524 m (5') 77.1 kg (170 lb)       PHYSICAL EXAM    VITAL SIGNS: BP (!) 156/116   Pulse 74   Temp 97.1  F (36.2  C) (Temporal)   Resp 30   Ht 1.524 m (5')   Wt 77.1 kg (170 lb)   SpO2 99%   BMI 33.20 kg/m      General Appearance: Well-appearing, well-nourished, moderate distress secondary to pain.  Head:  Normocephalic, without obvious abnormality, atraumatic  Eyes:  PERRL, conjunctiva/corneas clear, EOM's intact,  ENT:  Lips, mucosa, and tongue normal, membranes are moist without  pallor  Neck:  Normal ROM, symmetrical, trachea midline    Cardio:  Regular rate and rhythm, no murmur, rub or gallop, 2+ pulses symmetric in all extremities  Pulm:  Clear to auscultation bilaterally, respirations unlabored,  Back:  ROM normal, no CVA tenderness, no spinal tenderness, no paraspinal tenderness  Abdomen:  Soft, bilateral upper quadrant tenderness, no rebound or guarding.  Musculoskeletal: Full ROM, no edema, no cyanosis, good ROM of major joints  Integument:  Warm, Dry, No erythema, No rash.    Neurologic:  Alert & oriented.  No focal deficits appreciated.    Psychiatric:  Affect normal, Judgment normal, Mood normal.      LABS  Results for orders placed or performed during the hospital encounter of 05/31/25 (from the past 24 hours)   CBC with platelets + differential    Narrative    The following orders were created for panel order CBC with platelets + differential.  Procedure                               Abnormality         Status                     ---------                               -----------         ------                     CBC with platelets and ...[[id number]]                      Final result                 Please view results for these tests on the individual orders.   Basic metabolic panel   Result Value Ref Range    Sodium 143 135 - 145 mmol/L    Potassium 3.7 3.4 - 5.3 mmol/L    Chloride 102 98 - 107 mmol/L    Carbon Dioxide (CO2) 26 22 - 29 mmol/L    Anion Gap 15 7 - 15 mmol/L    Urea Nitrogen 17.5 6.0 - 20.0 mg/dL    Creatinine 1.12 0.67 - 1.17 mg/dL    GFR Estimate 87 >60 mL/min/1.73m2    Calcium 10.5 (H) 8.8 - 10.4 mg/dL    Glucose 130 (H) 70 - 99 mg/dL   Hepatic function panel   Result Value Ref Range    Protein Total 8.0 6.4 - 8.3 g/dL    Albumin 5.2 3.5 - 5.2 g/dL    Bilirubin Total 1.0 <=1.2 mg/dL    Alkaline Phosphatase 41 40 - 150 U/L    AST 37 0 - 45 U/L    ALT 77 (H) 0 - 70 U/L    Bilirubin Direct 0.26 0.00 - 0.45 mg/dL   Lipase   Result Value Ref Range    Lipase 60  13 - 60 U/L   Lactic Acid Whole Blood with 1X Repeat in 2 HR when >2   Result Value Ref Range    Lactic Acid, Initial 1.7 0.7 - 2.0 mmol/L   CBC with platelets and differential   Result Value Ref Range    WBC Count 10.2 4.0 - 11.0 10e3/uL    RBC Count 5.81 4.40 - 5.90 10e6/uL    Hemoglobin 17.1 13.3 - 17.7 g/dL    Hematocrit 51.3 40.0 - 53.0 %    MCV 88 78 - 100 fL    MCH 29.4 26.5 - 33.0 pg    MCHC 33.3 31.5 - 36.5 g/dL    RDW 12.6 10.0 - 15.0 %    Platelet Count 190 150 - 450 10e3/uL    % Neutrophils 80 %    % Lymphocytes 16 %    % Monocytes 4 %    % Eosinophils 0 %    % Basophils 0 %    % Immature Granulocytes 0 %    NRBCs per 100 WBC 0 <1 /100    Absolute Neutrophils 8.1 1.6 - 8.3 10e3/uL    Absolute Lymphocytes 1.6 0.8 - 5.3 10e3/uL    Absolute Monocytes 0.4 0.0 - 1.3 10e3/uL    Absolute Eosinophils 0.0 0.0 - 0.7 10e3/uL    Absolute Basophils 0.0 0.0 - 0.2 10e3/uL    Absolute Immature Granulocytes 0.0 <=0.4 10e3/uL    Absolute NRBCs 0.0 10e3/uL   CTA Chest Abdomen Pelvis w Contrast    Narrative    EXAM: CTA CHEST ABDOMEN PELVIS W CONTRAST  LOCATION: [facility]  DATE: 5/31/2025    INDICATION: Abdominal pain with radiation to the back, rule out dissection  COMPARISON: CT 6/23/2022  TECHNIQUE: CT angiogram chest abdomen pelvis during arterial phase of injection of IV contrast. 2D and 3D MIP reconstructions were performed by the CT technologist. Dose reduction techniques were used.   CONTRAST: Isovue  370 90mL    FINDINGS:   CT ANGIOGRAM CHEST, ABDOMEN, AND PELVIS: No aneurysm, intramural hematoma, or dissection of the aorta. Patent great vessels, major abdominal branches, and proximal lower extremity arteries without significant stenosis. No evidence of pulmonary embolism.    LUNGS AND PLEURA: Normal.    MEDIASTINUM/AXILLAE: Normal.    CORONARY ARTERY CALCIFICATION: None.    HEPATOBILIARY: Hepatic steatosis. No biliary duct dilatation. Noncalcified gallbladder stone in the  fundus.    PANCREAS: Normal.    SPLEEN: Normal.    ADRENAL GLANDS: Normal.    KIDNEYS/BLADDER: Normal.    BOWEL: Normal, including appendix.    LYMPH NODES: Normal.    PELVIC ORGANS: Normal.    MUSCULOSKELETAL: Chronic bilateral L5 spondylolysis with mild spondylolisthesis.      Impression    IMPRESSION:  1.  No evidence of acute aortic syndrome.   2.  Hepatic steatosis.  3.  Cholelithiasis.       UA with Microscopic reflex to Culture    Specimen: Urine, Midstream   Result Value Ref Range    Color Urine Colorless Colorless, Straw, Light Yellow, Yellow    Appearance Urine Clear Clear    Glucose Urine Negative Negative mg/dL    Bilirubin Urine Negative Negative    Ketones Urine 5 (A) Negative mg/dL    Specific Gravity Urine 1.015 1.005 - 1.030    Blood Urine Negative Negative    pH Urine 7.0 5.0 - 7.0    Protein Albumin Urine Negative Negative mg/dL    Urobilinogen Urine <2.0 <2.0 mg/dL    Nitrite Urine Negative Negative    Leukocyte Esterase Urine Negative Negative    RBC Urine <1 <=2 /HPF    WBC Urine <1 <=5 /HPF    Narrative    Urine Culture not indicated  Urine Culture not indicated   US Abdomen Limited    Narrative    EXAM: US ABDOMEN LIMITED  LOCATION: [facility]  DATE: 5/31/2025    INDICATION: Upper abdominal pain with radiation to the back.  COMPARISON: Same day CTA chest, abdomen and pelvis.  TECHNIQUE: Limited abdominal ultrasound.    FINDINGS:    GALLBLADDER: Echogenic stones are seen within distended gallbladder with increased wall thickness measuring up to 4 mm and pericholecystic fluid measuring 7.2 x 0.5 x 1.8 cm. Areas of comet tail artifacts seen in the gallbladder. No sonographic Morgan's   sign.    BILE DUCTS: No intrahepatic biliary dilatation. The common duct is obscured by bowel gas.    LIVER: Increased echogenicity with smooth contour. Areas of decreased echogenicity seen adjacent to the gallbladder fossa compatible with focal fatty change. The portal vein is patent  with flow in the normal direction.    RIGHT KIDNEY: No hydronephrosis.    PANCREAS: The visualized portions are normal.    No ascites.        Impression    IMPRESSION:    1.  Cholelithiasis and adenomyomatosis.    2.  Gallbladder is distended with pericholecystic edema. Despite lack of reported sonographic Morgan sign in this patient reportedly premedicated for pain, acute cholecystitis is suspected.    3.  Hepatic steatosis.             RADIOLOGY  US Abdomen Limited   Final Result   IMPRESSION:      1.  Cholelithiasis and adenomyomatosis.      2.  Gallbladder is distended with pericholecystic edema. Despite lack of reported sonographic Morgan sign in this patient reportedly premedicated for pain, acute cholecystitis is suspected.      3.  Hepatic steatosis.            CTA Chest Abdomen Pelvis w Contrast   Final Result   IMPRESSION:   1.  No evidence of acute aortic syndrome.    2.  Hepatic steatosis.   3.  Cholelithiasis.                 I have independently interpreted the above image, CT imaging with mildly distended gallbladder. See radiology report for detail.          MEDICATIONS GIVEN IN THE EMERGENCY:  Medications   pantoprazole (PROTONIX) EC tablet 40 mg (has no administration in time range)   traZODone (DESYREL) half-tab 25 mg (has no administration in time range)   senna-docusate (SENOKOT-S/PERICOLACE) 8.6-50 MG per tablet 1 tablet (has no administration in time range)     Or   senna-docusate (SENOKOT-S/PERICOLACE) 8.6-50 MG per tablet 2 tablet (has no administration in time range)   ondansetron (ZOFRAN ODT) ODT tab 4 mg (has no administration in time range)     Or   ondansetron (ZOFRAN) injection 4 mg (has no administration in time range)   prochlorperazine (COMPAZINE) injection 10 mg (has no administration in time range)     Or   prochlorperazine (COMPAZINE) tablet 10 mg (has no administration in time range)   lactated ringers infusion (has no administration in time range)   hydrALAZINE (APRESOLINE)  tablet 10 mg (has no administration in time range)     Or   hydrALAZINE (APRESOLINE) injection 10 mg (has no administration in time range)   acetaminophen (TYLENOL) tablet 650 mg (has no administration in time range)     Or   acetaminophen (TYLENOL) Suppository 650 mg (has no administration in time range)   oxyCODONE IR (ROXICODONE) half-tab 2.5 mg (has no administration in time range)   oxyCODONE (ROXICODONE) tablet 5 mg (has no administration in time range)   HYDROmorphone (DILAUDID) injection 0.2 mg (has no administration in time range)   HYDROmorphone (DILAUDID) injection 0.4 mg (has no administration in time range)   melatonin tablet 5 mg (has no administration in time range)   polyethylene glycol (MIRALAX) Packet 17 g (has no administration in time range)   HYDROmorphone (DILAUDID) injection 1 mg (1 mg Intravenous $Given 5/31/25 1654)   ondansetron (ZOFRAN) injection 4 mg (4 mg Intravenous $Given 5/31/25 1654)   sodium chloride 0.9% BOLUS 1,000 mL (0 mLs Intravenous Stopped 5/31/25 1904)   iopamidol (ISOVUE-370) solution 90 mL (90 mLs Intravenous $Given 5/31/25 1715)   piperacillin-tazobactam (ZOSYN) 3.375 g vial to attach to  mL bag (0 g Intravenous Stopped 5/31/25 2107)       NEW PRESCRIPTIONS STARTED AT TODAY'S ER VISIT  Current Discharge Medication List           I, Sameer Dennis, am serving as a scribe to document services personally performed by Rocky Mark D.O., based on my observations and the provider's statements to me.  I, Rocky Mark D.O., attest that Sameer Dennis is acting in a scribe capacity, has observed my performance of the services and has documented them in accordance with my direction.     oRcky Mark D.O.  Emergency Medicine  [facility] EMERGENCY ROOM  [address] [address]  [phone]  Dept: [phone]       Rocky Mark,   05/31/25 3267

## 2025-06-01 ENCOUNTER — ANESTHESIA (OUTPATIENT)
Dept: SURGERY | Facility: CLINIC | Age: 35
End: 2025-06-01
Payer: COMMERCIAL

## 2025-06-01 ENCOUNTER — ANESTHESIA EVENT (OUTPATIENT)
Dept: SURGERY | Facility: CLINIC | Age: 35
End: 2025-06-01
Payer: COMMERCIAL

## 2025-06-01 VITALS
HEART RATE: 54 BPM | DIASTOLIC BLOOD PRESSURE: 77 MMHG | HEIGHT: 60 IN | OXYGEN SATURATION: 96 % | SYSTOLIC BLOOD PRESSURE: 139 MMHG | WEIGHT: 170 LBS | BODY MASS INDEX: 33.38 KG/M2 | RESPIRATION RATE: 20 BRPM | TEMPERATURE: 97.9 F

## 2025-06-01 LAB — GLUCOSE BLDC GLUCOMTR-MCNC: 120 MG/DL (ref 70–99)

## 2025-06-01 PROCEDURE — 99238 HOSP IP/OBS DSCHRG MGMT 30/<: CPT | Performed by: FAMILY MEDICINE

## 2025-06-01 PROCEDURE — 250N000011 HC RX IP 250 OP 636: Performed by: NURSE ANESTHETIST, CERTIFIED REGISTERED

## 2025-06-01 PROCEDURE — 258N000003 HC RX IP 258 OP 636: Performed by: SURGERY

## 2025-06-01 PROCEDURE — 272N000001 HC OR GENERAL SUPPLY STERILE: Performed by: SURGERY

## 2025-06-01 PROCEDURE — 250N000013 HC RX MED GY IP 250 OP 250 PS 637: Performed by: FAMILY MEDICINE

## 2025-06-01 PROCEDURE — 250N000013 HC RX MED GY IP 250 OP 250 PS 637: Performed by: SURGERY

## 2025-06-01 PROCEDURE — 710N000010 HC RECOVERY PHASE 1, LEVEL 2, PER MIN: Performed by: SURGERY

## 2025-06-01 PROCEDURE — 47562 LAPAROSCOPIC CHOLECYSTECTOMY: CPT | Performed by: SURGERY

## 2025-06-01 PROCEDURE — 99203 OFFICE O/P NEW LOW 30 MIN: CPT | Mod: 57 | Performed by: SURGERY

## 2025-06-01 PROCEDURE — 258N000003 HC RX IP 258 OP 636: Performed by: FAMILY MEDICINE

## 2025-06-01 PROCEDURE — 88304 TISSUE EXAM BY PATHOLOGIST: CPT | Mod: TC | Performed by: SURGERY

## 2025-06-01 PROCEDURE — 250N000009 HC RX 250: Performed by: NURSE ANESTHETIST, CERTIFIED REGISTERED

## 2025-06-01 PROCEDURE — 258N000003 HC RX IP 258 OP 636: Performed by: NURSE ANESTHETIST, CERTIFIED REGISTERED

## 2025-06-01 PROCEDURE — 370N000017 HC ANESTHESIA TECHNICAL FEE, PER MIN: Performed by: SURGERY

## 2025-06-01 PROCEDURE — 250N000011 HC RX IP 250 OP 636: Performed by: SURGERY

## 2025-06-01 PROCEDURE — 250N000025 HC SEVOFLURANE, PER MIN: Performed by: SURGERY

## 2025-06-01 PROCEDURE — G0378 HOSPITAL OBSERVATION PER HR: HCPCS

## 2025-06-01 PROCEDURE — 360N000076 HC SURGERY LEVEL 3, PER MIN: Performed by: SURGERY

## 2025-06-01 PROCEDURE — 82962 GLUCOSE BLOOD TEST: CPT

## 2025-06-01 RX ORDER — HYDROMORPHONE HCL IN WATER/PF 6 MG/30 ML
0.4 PATIENT CONTROLLED ANALGESIA SYRINGE INTRAVENOUS EVERY 5 MIN PRN
Status: DISCONTINUED | OUTPATIENT
Start: 2025-06-01 | End: 2025-06-01 | Stop reason: HOSPADM

## 2025-06-01 RX ORDER — OXYCODONE HYDROCHLORIDE 5 MG/1
5 TABLET ORAL
Refills: 0 | Status: CANCELLED | OUTPATIENT
Start: 2025-06-01

## 2025-06-01 RX ORDER — LIDOCAINE 40 MG/G
CREAM TOPICAL
Status: DISCONTINUED | OUTPATIENT
Start: 2025-06-01 | End: 2025-06-01 | Stop reason: HOSPADM

## 2025-06-01 RX ORDER — SODIUM CHLORIDE, SODIUM LACTATE, POTASSIUM CHLORIDE, CALCIUM CHLORIDE 600; 310; 30; 20 MG/100ML; MG/100ML; MG/100ML; MG/100ML
INJECTION, SOLUTION INTRAVENOUS CONTINUOUS
Status: DISCONTINUED | OUTPATIENT
Start: 2025-06-01 | End: 2025-06-01 | Stop reason: HOSPADM

## 2025-06-01 RX ORDER — OXYCODONE HYDROCHLORIDE 5 MG/1
10 TABLET ORAL
Refills: 0 | Status: CANCELLED | OUTPATIENT
Start: 2025-06-01

## 2025-06-01 RX ORDER — HYDROMORPHONE HCL IN WATER/PF 6 MG/30 ML
0.2 PATIENT CONTROLLED ANALGESIA SYRINGE INTRAVENOUS
Status: DISCONTINUED | OUTPATIENT
Start: 2025-06-01 | End: 2025-06-01 | Stop reason: HOSPADM

## 2025-06-01 RX ORDER — PROPOFOL 10 MG/ML
INJECTION, EMULSION INTRAVENOUS PRN
Status: DISCONTINUED | OUTPATIENT
Start: 2025-06-01 | End: 2025-06-01

## 2025-06-01 RX ORDER — NALOXONE HYDROCHLORIDE 0.4 MG/ML
0.1 INJECTION, SOLUTION INTRAMUSCULAR; INTRAVENOUS; SUBCUTANEOUS
Status: DISCONTINUED | OUTPATIENT
Start: 2025-06-01 | End: 2025-06-01 | Stop reason: HOSPADM

## 2025-06-01 RX ORDER — POLYETHYLENE GLYCOL 3350 17 G/17G
17 POWDER, FOR SOLUTION ORAL DAILY
Status: DISCONTINUED | OUTPATIENT
Start: 2025-06-02 | End: 2025-06-01 | Stop reason: HOSPADM

## 2025-06-01 RX ORDER — GLYCOPYRROLATE 0.2 MG/ML
INJECTION, SOLUTION INTRAMUSCULAR; INTRAVENOUS PRN
Status: DISCONTINUED | OUTPATIENT
Start: 2025-06-01 | End: 2025-06-01

## 2025-06-01 RX ORDER — ONDANSETRON 2 MG/ML
INJECTION INTRAMUSCULAR; INTRAVENOUS PRN
Status: DISCONTINUED | OUTPATIENT
Start: 2025-06-01 | End: 2025-06-01

## 2025-06-01 RX ORDER — ONDANSETRON 2 MG/ML
4 INJECTION INTRAMUSCULAR; INTRAVENOUS EVERY 30 MIN PRN
Status: CANCELLED | OUTPATIENT
Start: 2025-06-01

## 2025-06-01 RX ORDER — KETOROLAC TROMETHAMINE 30 MG/ML
INJECTION, SOLUTION INTRAMUSCULAR; INTRAVENOUS PRN
Status: DISCONTINUED | OUTPATIENT
Start: 2025-06-01 | End: 2025-06-01

## 2025-06-01 RX ORDER — BUPIVACAINE HYDROCHLORIDE 2.5 MG/ML
INJECTION, SOLUTION INFILTRATION; PERINEURAL PRN
Status: DISCONTINUED | OUTPATIENT
Start: 2025-06-01 | End: 2025-06-01 | Stop reason: HOSPADM

## 2025-06-01 RX ORDER — OXYCODONE HYDROCHLORIDE 5 MG/1
5 TABLET ORAL EVERY 4 HOURS PRN
Status: DISCONTINUED | OUTPATIENT
Start: 2025-06-01 | End: 2025-06-01 | Stop reason: HOSPADM

## 2025-06-01 RX ORDER — ONDANSETRON 4 MG/1
4 TABLET, ORALLY DISINTEGRATING ORAL EVERY 30 MIN PRN
Status: DISCONTINUED | OUTPATIENT
Start: 2025-06-01 | End: 2025-06-01 | Stop reason: HOSPADM

## 2025-06-01 RX ORDER — DEXAMETHASONE SODIUM PHOSPHATE 10 MG/ML
INJECTION, SOLUTION INTRAMUSCULAR; INTRAVENOUS PRN
Status: DISCONTINUED | OUTPATIENT
Start: 2025-06-01 | End: 2025-06-01

## 2025-06-01 RX ORDER — ACETAMINOPHEN 325 MG/1
975 TABLET ORAL EVERY 8 HOURS
Status: DISCONTINUED | OUTPATIENT
Start: 2025-06-01 | End: 2025-06-01 | Stop reason: HOSPADM

## 2025-06-01 RX ORDER — AMOXICILLIN 250 MG
1 CAPSULE ORAL 2 TIMES DAILY
Status: DISCONTINUED | OUTPATIENT
Start: 2025-06-01 | End: 2025-06-01 | Stop reason: HOSPADM

## 2025-06-01 RX ORDER — OXYCODONE HYDROCHLORIDE 5 MG/1
10 TABLET ORAL EVERY 4 HOURS PRN
Status: DISCONTINUED | OUTPATIENT
Start: 2025-06-01 | End: 2025-06-01 | Stop reason: HOSPADM

## 2025-06-01 RX ORDER — FENTANYL CITRATE 50 UG/ML
50 INJECTION, SOLUTION INTRAMUSCULAR; INTRAVENOUS EVERY 5 MIN PRN
Status: DISCONTINUED | OUTPATIENT
Start: 2025-06-01 | End: 2025-06-01 | Stop reason: HOSPADM

## 2025-06-01 RX ORDER — LIDOCAINE HYDROCHLORIDE 10 MG/ML
INJECTION, SOLUTION INFILTRATION; PERINEURAL PRN
Status: DISCONTINUED | OUTPATIENT
Start: 2025-06-01 | End: 2025-06-01

## 2025-06-01 RX ORDER — CEFAZOLIN SODIUM/WATER 2 G/20 ML
2 SYRINGE (ML) INTRAVENOUS
Status: COMPLETED | OUTPATIENT
Start: 2025-06-01 | End: 2025-06-01

## 2025-06-01 RX ORDER — DOCUSATE SODIUM 100 MG/1
100 CAPSULE, LIQUID FILLED ORAL 2 TIMES DAILY
Qty: 30 CAPSULE | Refills: 0 | Status: SHIPPED | OUTPATIENT
Start: 2025-06-01

## 2025-06-01 RX ORDER — ONDANSETRON 4 MG/1
4 TABLET, ORALLY DISINTEGRATING ORAL EVERY 30 MIN PRN
Status: CANCELLED | OUTPATIENT
Start: 2025-06-01

## 2025-06-01 RX ORDER — DEXAMETHASONE SODIUM PHOSPHATE 4 MG/ML
4 INJECTION, SOLUTION INTRA-ARTICULAR; INTRALESIONAL; INTRAMUSCULAR; INTRAVENOUS; SOFT TISSUE
Status: DISCONTINUED | OUTPATIENT
Start: 2025-06-01 | End: 2025-06-01 | Stop reason: HOSPADM

## 2025-06-01 RX ORDER — SODIUM CHLORIDE, SODIUM LACTATE, POTASSIUM CHLORIDE, AND CALCIUM CHLORIDE .6; .31; .03; .02 G/100ML; G/100ML; G/100ML; G/100ML
IRRIGANT IRRIGATION PRN
Status: DISCONTINUED | OUTPATIENT
Start: 2025-06-01 | End: 2025-06-01 | Stop reason: HOSPADM

## 2025-06-01 RX ORDER — FENTANYL CITRATE 50 UG/ML
25 INJECTION, SOLUTION INTRAMUSCULAR; INTRAVENOUS EVERY 5 MIN PRN
Status: DISCONTINUED | OUTPATIENT
Start: 2025-06-01 | End: 2025-06-01 | Stop reason: HOSPADM

## 2025-06-01 RX ORDER — EPHEDRINE SULFATE 50 MG/ML
INJECTION, SOLUTION INTRAMUSCULAR; INTRAVENOUS; SUBCUTANEOUS PRN
Status: DISCONTINUED | OUTPATIENT
Start: 2025-06-01 | End: 2025-06-01

## 2025-06-01 RX ORDER — BISACODYL 10 MG
10 SUPPOSITORY, RECTAL RECTAL DAILY PRN
Status: DISCONTINUED | OUTPATIENT
Start: 2025-06-04 | End: 2025-06-01 | Stop reason: HOSPADM

## 2025-06-01 RX ORDER — FENTANYL CITRATE 50 UG/ML
INJECTION, SOLUTION INTRAMUSCULAR; INTRAVENOUS PRN
Status: DISCONTINUED | OUTPATIENT
Start: 2025-06-01 | End: 2025-06-01

## 2025-06-01 RX ORDER — HYDROCODONE BITARTRATE AND ACETAMINOPHEN 5; 325 MG/1; MG/1
1 TABLET ORAL EVERY 6 HOURS PRN
Qty: 10 TABLET | Refills: 0 | Status: SHIPPED | OUTPATIENT
Start: 2025-06-01 | End: 2025-06-04

## 2025-06-01 RX ORDER — HYDROMORPHONE HCL IN WATER/PF 6 MG/30 ML
0.4 PATIENT CONTROLLED ANALGESIA SYRINGE INTRAVENOUS
Status: DISCONTINUED | OUTPATIENT
Start: 2025-06-01 | End: 2025-06-01 | Stop reason: HOSPADM

## 2025-06-01 RX ORDER — ONDANSETRON 2 MG/ML
4 INJECTION INTRAMUSCULAR; INTRAVENOUS EVERY 30 MIN PRN
Status: DISCONTINUED | OUTPATIENT
Start: 2025-06-01 | End: 2025-06-01 | Stop reason: HOSPADM

## 2025-06-01 RX ORDER — DEXAMETHASONE SODIUM PHOSPHATE 4 MG/ML
4 INJECTION, SOLUTION INTRA-ARTICULAR; INTRALESIONAL; INTRAMUSCULAR; INTRAVENOUS; SOFT TISSUE
Status: CANCELLED | OUTPATIENT
Start: 2025-06-01

## 2025-06-01 RX ORDER — SODIUM CHLORIDE, SODIUM LACTATE, POTASSIUM CHLORIDE, CALCIUM CHLORIDE 600; 310; 30; 20 MG/100ML; MG/100ML; MG/100ML; MG/100ML
INJECTION, SOLUTION INTRAVENOUS CONTINUOUS PRN
Status: DISCONTINUED | OUTPATIENT
Start: 2025-06-01 | End: 2025-06-01

## 2025-06-01 RX ORDER — NALOXONE HYDROCHLORIDE 0.4 MG/ML
0.1 INJECTION, SOLUTION INTRAMUSCULAR; INTRAVENOUS; SUBCUTANEOUS
Status: CANCELLED | OUTPATIENT
Start: 2025-06-01

## 2025-06-01 RX ORDER — ENOXAPARIN SODIUM 100 MG/ML
40 INJECTION SUBCUTANEOUS EVERY 24 HOURS
Status: DISCONTINUED | OUTPATIENT
Start: 2025-06-02 | End: 2025-06-01 | Stop reason: HOSPADM

## 2025-06-01 RX ORDER — HYDROMORPHONE HCL IN WATER/PF 6 MG/30 ML
0.2 PATIENT CONTROLLED ANALGESIA SYRINGE INTRAVENOUS EVERY 5 MIN PRN
Status: DISCONTINUED | OUTPATIENT
Start: 2025-06-01 | End: 2025-06-01 | Stop reason: HOSPADM

## 2025-06-01 RX ORDER — CEFAZOLIN SODIUM/WATER 2 G/20 ML
2 SYRINGE (ML) INTRAVENOUS SEE ADMIN INSTRUCTIONS
Status: DISCONTINUED | OUTPATIENT
Start: 2025-06-01 | End: 2025-06-01 | Stop reason: HOSPADM

## 2025-06-01 RX ADMIN — SENNOSIDES AND DOCUSATE SODIUM 1 TABLET: 50; 8.6 TABLET ORAL at 13:09

## 2025-06-01 RX ADMIN — SODIUM CHLORIDE, SODIUM LACTATE, POTASSIUM CHLORIDE, AND CALCIUM CHLORIDE: .6; .31; .03; .02 INJECTION, SOLUTION INTRAVENOUS at 08:59

## 2025-06-01 RX ADMIN — LIDOCAINE HYDROCHLORIDE 4 ML: 10 INJECTION, SOLUTION INFILTRATION; PERINEURAL at 09:07

## 2025-06-01 RX ADMIN — FENTANYL CITRATE 100 MCG: 50 INJECTION INTRAMUSCULAR; INTRAVENOUS at 09:07

## 2025-06-01 RX ADMIN — PANTOPRAZOLE SODIUM 40 MG: 40 TABLET, DELAYED RELEASE ORAL at 08:02

## 2025-06-01 RX ADMIN — ONDANSETRON 4 MG: 2 INJECTION INTRAMUSCULAR; INTRAVENOUS at 09:50

## 2025-06-01 RX ADMIN — ROCURONIUM BROMIDE 40 MG: 10 INJECTION, SOLUTION INTRAVENOUS at 09:07

## 2025-06-01 RX ADMIN — KETOROLAC TROMETHAMINE 15 MG: 30 INJECTION, SOLUTION INTRAMUSCULAR at 10:27

## 2025-06-01 RX ADMIN — HYDROMORPHONE HYDROCHLORIDE 0.5 MG: 1 INJECTION, SOLUTION INTRAMUSCULAR; INTRAVENOUS; SUBCUTANEOUS at 09:33

## 2025-06-01 RX ADMIN — MIDAZOLAM 2 MG: 1 INJECTION INTRAMUSCULAR; INTRAVENOUS at 08:59

## 2025-06-01 RX ADMIN — Medication 2 G: at 09:14

## 2025-06-01 RX ADMIN — GLYCOPYRROLATE 0.2 MG: 0.2 INJECTION INTRAMUSCULAR; INTRAVENOUS at 09:07

## 2025-06-01 RX ADMIN — Medication 5 MG: at 09:22

## 2025-06-01 RX ADMIN — SODIUM CHLORIDE, POTASSIUM CHLORIDE, SODIUM LACTATE AND CALCIUM CHLORIDE: 600; 310; 30; 20 INJECTION, SOLUTION INTRAVENOUS at 08:01

## 2025-06-01 RX ADMIN — PROPOFOL 170 MG: 10 INJECTION, EMULSION INTRAVENOUS at 09:07

## 2025-06-01 RX ADMIN — DEXAMETHASONE SODIUM PHOSPHATE 5 MG: 10 INJECTION, SOLUTION INTRAMUSCULAR; INTRAVENOUS at 09:11

## 2025-06-01 RX ADMIN — Medication 150 MG: at 10:07

## 2025-06-01 RX ADMIN — ACETAMINOPHEN 975 MG: 325 TABLET ORAL at 13:08

## 2025-06-01 RX ADMIN — DEXMEDETOMIDINE HYDROCHLORIDE 8 MCG: 100 INJECTION, SOLUTION INTRAVENOUS at 09:34

## 2025-06-01 RX ADMIN — HYDROMORPHONE HYDROCHLORIDE 0.5 MG: 1 INJECTION, SOLUTION INTRAMUSCULAR; INTRAVENOUS; SUBCUTANEOUS at 09:28

## 2025-06-01 ASSESSMENT — ACTIVITIES OF DAILY LIVING (ADL)
ADLS_ACUITY_SCORE: 50
ADLS_ACUITY_SCORE: 48
ADLS_ACUITY_SCORE: 52
ADLS_ACUITY_SCORE: 48
ADLS_ACUITY_SCORE: 55
ADLS_ACUITY_SCORE: 52
ADLS_ACUITY_SCORE: 48
ADLS_ACUITY_SCORE: 48
ADLS_ACUITY_SCORE: 55
ADLS_ACUITY_SCORE: 52
ADLS_ACUITY_SCORE: 50
ADLS_ACUITY_SCORE: 48
ADLS_ACUITY_SCORE: 55
ADLS_ACUITY_SCORE: 48
ADLS_ACUITY_SCORE: 50
ADLS_ACUITY_SCORE: 50
ADLS_ACUITY_SCORE: 48
ADLS_ACUITY_SCORE: 55

## 2025-06-01 NOTE — PLAN OF CARE
"PRIMARY DIAGNOSIS: \"GENERIC\" NURSING  OUTPATIENT/OBSERVATION GOALS TO BE MET BEFORE DISCHARGE:  ADLs back to baseline: Yes    Activity and level of assistance: Ambulating independently.    Pain status: Pain free.    Return to near baseline physical activity: Yes     Discharge Planner Nurse   Safe discharge environment identified: Yes  Barriers to discharge: No       Entered by: Shawn Moncada RN 05/31/2025 10:56 PM     Please review provider order for any additional goals.   Nurse to notify provider when observation goals have been met and patient is ready for discharge.    Patient alert and oriented. VSS. Room Air. LR infusing at 100ml/hr. Ambulating independently. Patient took shower when he got to unit. Denies pain at this time. Comfortable in bed. NPO, patient aware. Call light within reach.  "

## 2025-06-01 NOTE — ANESTHESIA POSTPROCEDURE EVALUATION
Patient: Anastacio Li    Procedure: Procedure(s):  CHOLECYSTECTOMY, LAPAROSCOPIC       Anesthesia Type:  General    Note:  Disposition: Inpatient   Postop Pain Control: Uneventful            Sign Out: Well controlled pain   PONV: No   Neuro/Psych: Uneventful            Sign Out: Acceptable/Baseline neuro status   Airway/Respiratory: Uneventful            Sign Out: Acceptable/Baseline resp. status   CV/Hemodynamics: Uneventful            Sign Out: Acceptable CV status; No obvious hypovolemia; No obvious fluid overload   Other NRE:    DID A NON-ROUTINE EVENT OCCUR?        Last vitals:  Vitals Value Taken Time   /97 06/01/25 11:11   Temp 36.2  C (97.2  F) 06/01/25 10:24   Pulse 68 06/01/25 11:14   Resp 22 06/01/25 11:13   SpO2 99 % 06/01/25 11:14   Vitals shown include unfiled device data.    Electronically Signed By: Burt Almaguer MD  June 1, 2025  5:56 PM

## 2025-06-01 NOTE — DISCHARGE INSTRUCTIONS
Please call your surgeon, Dr. Tomlinson, at [phone] to schedule a follow-up appointment regarding follow-up discussions and or/visits relating to surgery.  You may also call if you have any questions or concerns regarding her hospital stay and any other surgical issue you may have.     ACTIVITIES:  No heavy lifting over 20 pounds or strenuous activities for a total of 2 weeks      WOUND CARE:  May shower 24 hours after surgery, no baths or hot tubs for a total of 2 weeks.  May remove the top superficial dressings if not already done.  If you have white stickers over the incisions these will stay on for a total of 3 to 4 weeks

## 2025-06-01 NOTE — ANESTHESIA PREPROCEDURE EVALUATION
Anesthesia Pre-Procedure Evaluation    Patient: Anastacio Li   MRN: [id number] : 1990          Procedure : Procedure(s):  CHOLECYSTECTOMY, LAPAROSCOPIC         Past Medical History:   Diagnosis Date    Anxiety     GERD (gastroesophageal reflux disease) 2025    Insomnia 2025    Prediabetes 2025      Past Surgical History:   Procedure Laterality Date    NO PAST SURGERIES        No Known Allergies   Social History     Tobacco Use    Smoking status: Never     Passive exposure: Never    Smokeless tobacco: Never   Substance Use Topics    Alcohol use: Yes     Comment: 2 times per week will binge drink      Wt Readings from Last 1 Encounters:   25 77.1 kg (170 lb)        Anesthesia Evaluation            ROS/MED HX  ENT/Pulmonary:  - neg pulmonary ROS     Neurologic:  - neg neurologic ROS     Cardiovascular:  - neg cardiovascular ROS     METS/Exercise Tolerance:     Hematologic:  - neg hematologic  ROS     Musculoskeletal:  - neg musculoskeletal ROS     GI/Hepatic:  - neg GI/hepatic ROS   (+) GERD,                   Renal/Genitourinary:  - neg Renal ROS     Endo:  - neg endo ROS   (+)               Obesity,       Psychiatric/Substance Use:     (+) psychiatric history anxiety       Infectious Disease:  - neg infectious disease ROS     Malignancy:  - neg malignancy ROS     Other:  - neg other ROS            Physical Exam  Airway  Mallampati: II  Comments: Smaller mouth opening    Cardiovascular - normal examRate: normal rate     Dental   (+) Completely normal teeth      Pulmonary Breath sounds clear to auscultation        Neurological   He appears awake.    Other Findings       OUTSIDE LABS:  CBC:   Lab Results   Component Value Date    WBC 10.2 2025    WBC 7.6 2025    HGB 17.1 2025    HGB 15.4 2025    HCT 51.3 2025    HCT 46.4 2025     2025     2025     BMP:   Lab Results   Component Value Date     2025     2025     "POTASSIUM 3.7 05/31/2025    POTASSIUM 4.1 05/21/2025    CHLORIDE 102 05/31/2025    CHLORIDE 104 05/21/2025    CO2 26 05/31/2025    CO2 26 05/21/2025    BUN 17.5 05/31/2025    BUN 15.1 05/21/2025    CR 1.12 05/31/2025    CR 1.22 (H) 05/21/2025     (H) 05/31/2025    GLC 97 05/21/2025     COAGS: No results found for: \"PTT\", \"INR\", \"FIBR\"  POC: No results found for: \"BGM\", \"HCG\", \"HCGS\"  HEPATIC:   Lab Results   Component Value Date    ALBUMIN 5.2 05/31/2025    PROTTOTAL 8.0 05/31/2025    ALT 77 (H) 05/31/2025    AST 37 05/31/2025    GGT 61 05/21/2025    ALKPHOS 41 05/31/2025    BILITOTAL 1.0 05/31/2025     OTHER:   Lab Results   Component Value Date    LACT 1.7 05/31/2025    A1C 5.8 (H) 04/18/2025    ARMANI 10.5 (H) 05/31/2025    MAG 2.3 05/21/2025    LIPASE 60 05/31/2025    TSH 3.44 04/18/2025       Anesthesia Plan    ASA Status:  2      NPO Status: NPO Appropriate   Anesthesia Type: General.  Induction: intravenous, rapid sequence.  Maintenance: Balanced.   Techniques and Equipment:       - Monitoring Plan: standard ASA monitoring     Consents    Anesthesia Plan(s) and associated risks, benefits, and realistic alternatives discussed. Questions answered and patient/representative(s) expressed understanding.     - Discussed: anesthesiologist     - Discussed with:                Postoperative Care         Comments:                   Burt Almaguer MD    I have reviewed the pertinent notes and labs in the chart from the past 30 days and (re)examined the patient.  Any updates or changes from those notes are reflected in this note.    Clinically Significant Risk Factors Present on Admission                             # Obesity: Estimated body mass index is 33.2 kg/m  as calculated from the following:    Height as of this encounter: 1.524 m (5').    Weight as of this encounter: 77.1 kg (170 lb).                    "

## 2025-06-01 NOTE — PROGRESS NOTES
PRIMARY DIAGNOSIS: ACUTE PAIN  OUTPATIENT/OBSERVATION GOALS TO BE MET BEFORE DISCHARGE:  1. Pain Status: Pain free.    2. Return to near baseline physical activity: Yes    3. Cleared for discharge by consultants (if involved): No    Discharge Planner Nurse   Safe discharge environment identified: Yes  Barriers to discharge: Yes       Entered by: Arturo Michelle RN 06/01/2025 1:12 AM     Please review provider order for any additional goals.   Nurse to notify provider when observation goals have been met and patient is ready for discharge.    AO x4. VSS on RA. LR running at 100. Denied pain or n/v. Makes needs known. Call light within reach.

## 2025-06-01 NOTE — DISCHARGE SUMMARY
[facility] MEDICINE  DISCHARGE SUMMARY     Primary Care Physician: Madison Harden  Admission Date: 5/31/2025   Discharge Provider: Keaton Rowe MD Discharge Date: 6/1/2025    Diet:   Active Diet and Nourishment Order   Procedures    Advance Diet as Tolerated: Full Liquid Diet    Diet     Code Status: Full Code   Activity: DCACTIVITY: Activity as tolerated and No lifting, driving or strenuous activity for 1 week.  Off work        Condition at Discharge: Stable     REASON FOR PRESENTATION(See Admission Note for Details)   Abdominal pain    PRINCIPAL & ACTIVE DISCHARGE DIAGNOSES     Principal Problem:    Acute cholecystitis  Active Problems:    GERD (gastroesophageal reflux disease)    Insomnia    Prediabetes  Anxiety  Secondary hypertension  Clinically Significant Risk Factors Present on Admission                             # Obesity: Estimated body mass index is 33.2 kg/m  as calculated from the following:    Height as of this encounter: 1.524 m (5').    Weight as of this encounter: 77.1 kg (170 lb).              PENDING LABS     Unresulted Labs Ordered in the Past 30 Days of this Admission       No orders found for last 31 day(s).          PROCEDURES ( this hospitalization only)    Procedure(s):  CHOLECYSTECTOMY, LAPAROSCOPIC      RECOMMENDATIONS TO OUTPATIENT PROVIDER FOR F/U VISIT     Follow-up Appointments       Follow Up      Follow-up with primary care provider in 1 to 2 weeks.  Recheck blood pressure, liver enzyme levels and glucose.        Hospital Follow-up with Existing Primary Care Provider (PCP)          Schedule Primary Care visit within: 14 Days                 DISPOSITION     Home    SUMMARY OF HOSPITAL COURSE:      Anastacio Li is a 34 year old male with PMH signficant for insomnia, anxiety, GERD, prediabetes.  5/21 seen in the ED with complaints of epigastric pain.  LFTs were mildly elevated.  Diagnosed with gastritis and discharged home.  5/31/2025 presented  with reoccurrence worsening pain for the last 2 hours accompanied by nausea and vomiting.  Rated 10 out of 10 in severity.  LFTs mildly elevated again.  ALT 77.  Other blood work normal.  CTA chest abdomen pelvis showed hepatic steatosis and cholelithiasis.  Limited ultrasound shows cholelithiasis with adenomyomatosis distended gallbladder with pericholecystic edema.  Since receiving IV Dilaudid his pain has almost completely resolved.  Admitted.      General surgery consulted and took him to the OR on 6/1/2025.  Postoperatively patient is feeling well.  Diet will be advanced.  Follow-up as needed.      Patient did have some intermittent hypertension during his stay.  Likely secondary to pain/anxiety.  Recommend follow-up with primary care provider to recheck values.  Also should recheck LFTs.      Discharge Medications with Med changes:     Current Discharge Medication List        START taking these medications    Details   docusate sodium (COLACE) 100 MG capsule Take 1 capsule (100 mg) by mouth 2 times daily.  Qty: 30 capsule, Refills: 0    Associated Diagnoses: Acute cholecystitis      HYDROcodone-acetaminophen (NORCO) 5-325 MG tablet Take 1 tablet by mouth every 6 hours as needed for severe pain.  Qty: 10 tablet, Refills: 0    Associated Diagnoses: Acute cholecystitis           CONTINUE these medications which have NOT CHANGED    Details   acetaminophen (TYLENOL) 500 MG tablet Take 500-1,000 mg by mouth every 6 hours as needed for mild pain.      calcium carbonate (TUMS) 500 MG chewable tablet Take 1 chew tab by mouth 2 times daily as needed for heartburn.      omeprazole (PRILOSEC OTC) 20 MG EC tablet Take 20 mg by mouth daily.      simethicone (MYLICON) 80 MG chewable tablet Take 80 mg by mouth every 6 hours as needed for flatulence or cramping.      traZODone (DESYREL) 50 MG tablet Take 0.5 tablets (25 mg) by mouth at bedtime.  Qty: 60 tablet, Refills: 0    Associated Diagnoses: Primary insomnia        "        Rationale for medication changes:      Pain control after surgery      Consults     None  [place] [facility]    Immunizations given this encounter     Most Recent Immunizations   Administered Date(s) Administered    COVID-19 Monovalent 18+ (Moderna) 12/21/2021    TDAP (Adacel,Boostrix) 04/18/2025           Anticoagulation Information      Recent INR results: No results for input(s): \"INR\" in the last 168 hours.  Warfarin doses (if applicable) or name of other anticoagulant: N/A      SIGNIFICANT IMAGING FINDINGS     Results for orders placed or performed during the hospital encounter of 05/31/25   CTA Chest Abdomen Pelvis w Contrast    Impression    IMPRESSION:  1.  No evidence of acute aortic syndrome.   2.  Hepatic steatosis.  3.  Cholelithiasis.       US Abdomen Limited    Impression    IMPRESSION:    1.  Cholelithiasis and adenomyomatosis.    2.  Gallbladder is distended with pericholecystic edema. Despite lack of reported sonographic Morgan sign in this patient reportedly premedicated for pain, acute cholecystitis is suspected.    3.  Hepatic steatosis.           No results found for this or any previous visit (from the past 4320 hours).    SIGNIFICANT LABORATORY FINDINGS     Most Recent 3 CBC's:  Recent Labs   Lab Test 05/31/25 1649 05/21/25 1941 04/18/25  0822   WBC 10.2 7.6 5.0   HGB 17.1 15.4 16.5   MCV 88 90 88    188 175     Most Recent 3 BMP's:  Recent Labs   Lab Test 06/01/25  1031 05/31/25 1649 05/21/25 1941 06/23/22  0457   NA  --  143 141 139   POTASSIUM  --  3.7 4.1 4.1   CHLORIDE  --  102 104 103   CO2  --  26 26 24   BUN  --  17.5 15.1 20   CR  --  1.12 1.22* 1.12   ANIONGAP  --  15 11 12   ARMANI  --  10.5* 10.0 10.9*   * 130* 97 121     Most Recent 2 LFT's:  Recent Labs   Lab Test 05/31/25 1649 05/21/25 1941   AST 37 36   ALT 77* 74*   ALKPHOS 41 37*   BILITOTAL 1.0 1.1     Most Recent 3 INR's:No lab results found.  Most Recent D-dimer:No lab results found.  Most " "Recent TSH and T4:  Recent Labs   Lab Test 04/18/25  0822   TSH 3.44     Most Recent Hemoglobin A1c:  Recent Labs   Lab Test 04/18/25  0822   A1C 5.8*     Most Recent 6 glucoses:  Recent Labs   Lab Test 06/01/25  1031 05/31/25  1649 05/21/25  1941 06/23/22  0457   * 130* 97 121     Most Recent Urinalysis:  Recent Labs   Lab Test 05/31/25  1800   COLOR Colorless   APPEARANCE Clear   URINEGLC Negative   URINEBILI Negative   URINEKETONE 5*   SG 1.015   UBLD Negative   URINEPH 7.0   PROTEIN Negative   NITRITE Negative   LEUKEST Negative   RBCU <1   WBCU <1     Most Recent ESR & CRP:No lab results found.  No results found for: \"CTROPT\"   7-Day Micro Results       No results found for the last 168 hours.              Discharge Orders        Follow Up    Follow-up with primary care provider in 1 to 2 weeks.  Recheck blood pressure, liver enzyme levels and glucose.     Reason for your hospital stay    Cholecystitis.  Status post cholecystectomy/gallbladder removal     Activity    Your activity upon discharge: activity as tolerated and off work for the next week.  Minimize any heavy lifting or bending     When to contact your care team    Call [place] [facility] if you have any of the following: temperature greater than 100.5, increased drainage, increased swelling, or increased pain.     Diet    Follow this diet upon discharge: Current Diet:Orders Placed This Encounter      Advance Diet as Tolerated: Full Liquid Diet x 1 day then okay regular diet.  Recommend low-fat choices.     Hospital Follow-up with Existing Primary Care Provider (PCP)            Examination   Physical Exam   Temp:  [97.1  F (36.2  C)-97.9  F (36.6  C)] 97.3  F (36.3  C)  Pulse:  [50-94] 62  Resp:  [14-30] 19  BP: (128-167)/() 140/83  SpO2:  [87 %-100 %] 95 %  Wt Readings from Last 4 Encounters:   05/31/25 77.1 kg (170 lb)   05/21/25 77 kg (169 lb 12.1 oz)   04/18/25 78.9 kg (174 lb)   06/23/22 81.6 kg (180 lb)       Constitutional: " awake, alert, cooperative, no apparent distress, and appears stated age  Eyes: Lids and lashes normal, pupils equal, round and reactive to light, extra ocular muscles intact, sclera clear, conjunctiva normal  ENT: Normocephalic, without obvious abnormality, atraumatic, sinuses nontender on palpation, external ears without lesions, oral pharynx with moist mucous membranes, tonsils without erythema or exudates, gums normal and good dentition.  Respiratory: No increased work of breathing, good air exchange, clear to auscultation bilaterally, no crackles or wheezing  Cardiovascular: Normal apical impulse, regular rate and rhythm, normal S1 and S2, no S3 or S4, and no murmur noted  GI: Positive diminished bowel sounds soft mild distention.  Surgical incisions are clean dry and intact.  Bandages left in place.  Skin: normal skin color, texture, turgor, no redness, warmth, or swelling, and no rashes  Musculoskeletal: There is no redness, warmth, or swelling of the joints.  Full range of motion noted.  Motor strength is 5 out of 5 all extremities bilaterally.  Tone is normal. no lower extremity pitting edema present  Neurologic: Cranial nerves II-XII are grossly intact. Sensory:  Sensory intact  Neuropsychiatric: General: normal, calm, and normal eye contact Level of consciousness: alert / normal Affect: normal Orientation: oriented to self, place, time and situation Memory and insight: normal, memory for past and recent events intact, and thought process normal      Please see EMR for more detailed significant labs, imaging, consultant notes etc.    IKeaton MD, personally saw the patient today and spent less than or equal to 30 minutes discharging this patient.    Keaton Rowe MD  [facility]    CC:Morning, Madison CURRIE

## 2025-06-01 NOTE — PHARMACY-ADMISSION MEDICATION HISTORY
Pharmacist Admission Medication History    Admission medication history is complete. The information provided in this note is only as accurate as the sources available at the time of the update.    Information Source(s): Patient and CareEverywhere/SureScripts via in-person    Pertinent Information: Patient started a number of new OTC medications for GI symptoms recently relating to presentation. These include omeprazole, tums, and gas-X which patient did not previously take until recently.    Changes made to PTA medication list:  Added: acetaminophen, tums, gas-x, omeprazole  Deleted: None  Changed: None    Allergies reviewed with patient and updates made in EHR: yes    Medication History Completed By: Doug Humphries [facility] 5/31/2025 8:22 PM    PTA Med List   Medication Sig Last Dose/Taking    acetaminophen (TYLENOL) 500 MG tablet Take 500-1,000 mg by mouth every 6 hours as needed for mild pain. Past Week    calcium carbonate (TUMS) 500 MG chewable tablet Take 1 chew tab by mouth 2 times daily as needed for heartburn. 5/31/2025 Morning    omeprazole (PRILOSEC OTC) 20 MG EC tablet Take 20 mg by mouth daily. 5/30/2025 Morning    simethicone (MYLICON) 80 MG chewable tablet Take 80 mg by mouth every 6 hours as needed for flatulence or cramping. 5/31/2025 Morning    traZODone (DESYREL) 50 MG tablet Take 0.5 tablets (25 mg) by mouth at bedtime. Past Week

## 2025-06-01 NOTE — CONSULTS
HPI  34 year old year old male who I have been consulted by No ref. provider found for evaluation of Abdominal Pain and Nausea & Vomiting  34-year-old male with a several year history of intermittent epigastric and right upper quadrant pain.  He has had fairly significant pain over the last day prompting him to visit the emergency room.  He had similar pain in the past and attributed this to indigestion.  Denies any acholic stool or dark-colored urine.  No fevers or chills nausea or vomiting.      Allergies:Patient has no known allergies.    Past Medical History:   Diagnosis Date    Anxiety     GERD (gastroesophageal reflux disease) 05/31/2025    Insomnia 05/31/2025    Prediabetes 04/2025       Past Surgical History:   Procedure Laterality Date    NO PAST SURGERIES           CURRENT MEDS:    Current Facility-Administered Medications:     acetaminophen (TYLENOL) tablet 650 mg, 650 mg, Oral, Q4H PRN **OR** acetaminophen (TYLENOL) Suppository 650 mg, 650 mg, Rectal, Q4H PRN, Keaton Rowe MD    ceFAZolin Sodium (ANCEF) injection 2 g, 2 g, Intravenous, Pre-Op/Pre-procedure x 1 dose, Sai Tomlinson, DO    ceFAZolin Sodium (ANCEF) injection 2 g, 2 g, Intravenous, See Admin Instructions, Sai Tomlinson,     hydrALAZINE (APRESOLINE) tablet 10 mg, 10 mg, Oral, Q4H PRN **OR** hydrALAZINE (APRESOLINE) injection 10 mg, 10 mg, Intravenous, Q4H PRN, Keaton Rowe MD    HYDROmorphone (DILAUDID) injection 0.2 mg, 0.2 mg, Intravenous, Q2H PRN, Keaton Rowe MD    HYDROmorphone (DILAUDID) injection 0.4 mg, 0.4 mg, Intravenous, Q2H PRN, Keaton Rowe MD    lactated ringers infusion, , Intravenous, Continuous, Burt Almaguer MD    lactated ringers infusion, , Intravenous, Continuous, Keaton Rowe MD, Last Rate: 100 mL/hr at 06/01/25 0801, New Bag at 06/01/25 0801    lidocaine (LMX4) cream, , Topical, Q1H PRN, Burt Almaguer MD    lidocaine 1 % 0.1-1 mL, 0.1-1 mL, Other, Q1H PRN, Burt Almaguer  MD GINNY    melatonin tablet 5 mg, 5 mg, Oral, At Bedtime PRN, Keaton Rowe MD    ondansetron (ZOFRAN ODT) ODT tab 4 mg, 4 mg, Oral, Q6H PRN **OR** ondansetron (ZOFRAN) injection 4 mg, 4 mg, Intravenous, Q6H PRN, Keaton Rowe MD    oxyCODONE (ROXICODONE) tablet 5 mg, 5 mg, Oral, Q4H PRN, Keaton Rowe MD    oxyCODONE IR (ROXICODONE) half-tab 2.5 mg, 2.5 mg, Oral, Q4H PRN, Keaton Rowe MD    pantoprazole (PROTONIX) EC tablet 40 mg, 40 mg, Oral, Daily, Keaton Rowe MD, 40 mg at 06/01/25 0802    polyethylene glycol (MIRALAX) Packet 17 g, 17 g, Oral, BID PRN, Keaton Rowe MD    prochlorperazine (COMPAZINE) injection 10 mg, 10 mg, Intravenous, Q6H PRN **OR** prochlorperazine (COMPAZINE) tablet 10 mg, 10 mg, Oral, Q6H PRN, Keaton Rowe MD    senna-docusate (SENOKOT-S/PERICOLACE) 8.6-50 MG per tablet 1 tablet, 1 tablet, Oral, BID PRN **OR** senna-docusate (SENOKOT-S/PERICOLACE) 8.6-50 MG per tablet 2 tablet, 2 tablet, Oral, BID PRN, Keaton Rowe MD    sodium chloride (PF) 0.9% PF flush 3 mL, 3 mL, Intracatheter, Q8H, Burt Almaguer MD    sodium chloride (PF) 0.9% PF flush 3 mL, 3 mL, Intracatheter, q1 min prn, Burt Almaguer MD    traZODone (DESYREL) half-tab 25 mg, 25 mg, Oral, At Bedtime PRN, Keaton Rowe MD    FAMHX-reviewed     reports that he has never smoked. He has never been exposed to tobacco smoke. He has never used smokeless tobacco. He reports current alcohol use. He reports current drug use. Drug: Marijuana.    Review of Systems:  The 12 point review of systems  is within normal limits except for as mentioned above in the HPI.  General ROS: No complaints or constitutional symptoms  Ophthalmic ROS: No complaints of visual changes  Skin: No complaints or symptoms   Endocrine: No complaints or symptoms  Hematologic/Lymphatic: No symptoms or complaints  Psychiatric: No symptoms or complaints  Respiratory ROS: no cough, shortness of breath, or wheezing  Cardiovascular  ROS: no chest pain or dyspnea on exertion  Gastrointestinal ROS: As per HPI  Genito-Urinary ROS: no dysuria, trouble voiding, or hematuria  Musculoskeletal ROS: no joint or muscle pain  Neurological ROS: no TIA or stroke symptoms      EXAM:  BP (!) 154/88 (BP Location: Right arm, Patient Position: Sitting, Cuff Size: Adult Regular)   Pulse 52   Temp 97.9  F (36.6  C) (Oral)   Resp 16   Ht 1.524 m (5')   Wt 77.1 kg (170 lb)   SpO2 96%   BMI 33.20 kg/m    GENERAL: Well developed male, No acute distress, pleasant and conversant   EYES: Pupils equal, round and reactive, no scleral icterus  ABDOMEN: Soft, mild epigastric and right upper quadrant tenderness with no Morgan sign  SKIN: Pink, warm and dry, no obvious rashes or lesions   NEURO:No focal deficits, ambulatory  MUSCULOSKELETAL:No obvious deformities, no swelling, normal appearing      LABS:  Lab Results   Component Value Date    WBC 10.2 05/31/2025    HGB 17.1 05/31/2025    HCT 51.3 05/31/2025    MCV 88 05/31/2025     05/31/2025     INR/Prothrombin Time  Recent Labs   Lab 05/31/25  1649      CO2 26   BUN 17.5     Lab Results   Component Value Date    ALT 77 (H) 05/31/2025    AST 37 05/31/2025    GGT 61 05/21/2025    ALKPHOS 41 05/31/2025       IMAGES:   Relevant images were reviewed and discussed with the patient.  Notable findings were: Ultrasound and CT images reviewed demonstrates cholelithiasis    Assessment/Plan:   Anastacio Li is a 34 year old male with likely acute on chronic cholecystitis secondary to cholelithiasis.  The pathophysiology of gallstones and gallbladder inflammation was explained to the patient.  Risk and benefits of surgery versus nonoperative management strategies were also discussed.  Patient understands everything that was discussed and is consented to proceed with laparoscopic cholecystectomy.  He will discharge to home afterwards.      Mauricio Tomlinson D.O. FACS  [phone]

## 2025-06-01 NOTE — H&P
[facility] MEDICINE ADMISSION HISTORY AND PHYSICAL   Date of Admission: 5/31/2025  Anastacio Li, 1990, [id number]    Assessment and Plan:   Anastacio Li is a 34 year old male with PMH signficant for insomnia, anxiety, GERD, prediabetes.  5/21 seen in the ED with complaints of epigastric pain.  LFTs were mildly elevated.  Diagnosed with gastritis and discharged home.  5/31/2025 presented with reoccurrence worsening pain for the last 2 hours accompanied by nausea and vomiting.  Rated 10 out of 10 in severity.  LFTs mildly elevated again.  ALT 77.  Other blood work normal.  CTA chest abdomen pelvis showed hepatic steatosis and cholelithiasis.  Limited ultrasound shows cholelithiasis with adenomyomatosis distended gallbladder with pericholecystic edema.  Since receiving IV Dilaudid his pain has almost completely resolved.  Admitted.  General surgery planning to take in the AM to the OR.      Acute cholecystectomy  Hepatic steatosis  Observation MedSurg admission.  Imaging and laboratory findings as above.  General surgery consulted.  Planning to take to the OR in the morning.  Was given a dose of IV Zosyn in the ED.  No evidence of acute infection and white count is normal.  No further antibiotics at this time unless surgery wishes to order.  Will keep NPO.  Ordered IV fluids.  As needed pain medication and Zofran available.  Anticipate discharge 6/1 in the afternoon.  Should recheck LFTs as an outpatient.  Low surgical risk.    GERD  Order daily PPI.    Anxiety  Insomnia  Ordered bedtime trazodone as needed.    Prediabetes  Recently diagnosed.  A1c was 5.8.  ED glucose was 130.  Patient aware of these findings and is working with his primary care provider.    Hypertension  Blood pressure has been mild to moderately elevated during his stay.  Likely secondary to pain and some anxiety.  Should recheck blood pressure and follow-up with primary care provider.  Hydralazine ordered only for severe  elevations.    Anticoagulation   Low Risk/Ambulatory with no VTE prophylaxis indicated      FoleyNot present    Code Status: Full Code    Disposition: Observation   Medically Ready for Discharge: Anticipated Tomorrow     Lines/Drains/Airways       PIV Line  Duration             Peripheral IV 05/31/25 Anterior;Distal;Left Upper arm <1 day                    Clinically Significant Risk Factors Present on Admission                             # Obesity: Estimated body mass index is 33.2 kg/m  as calculated from the following:    Height as of this encounter: 1.524 m (5').    Weight as of this encounter: 77.1 kg (170 lb).              Chief Complaint Abdominal pain with nausea vomiting     HISTORY     Anastacio Li is a 34 year old male with PMH of insomnia, anxiety, GERD, prediabetes.  5/21 seen in the ED with complaints of epigastric pain.  LFTs were mildly elevated.  Diagnosed with gastritis and discharged home.  5/31/2025 presented with reoccurrence worsening pain for the last 2 hours accompanied by nausea and vomiting.  Rated 10 out of 10 in severity.  LFTs mildly elevated again.  ALT 77.  Other blood work normal.  CTA chest abdomen pelvis showed hepatic steatosis and cholelithiasis.  Limited ultrasound shows cholelithiasis with adenomyomatosis distended gallbladder with pericholecystic edema.  Admitted.  General surgery planning to take in the AM to the OR.  Patient has been noting tan stools.  Abdominal pain tends to get worse after eating a fatty meal.  Does admit to binge drinking 1-2 times per week 5-6 drinks at a time.  Has used THC edibles to help with sleep.  Denies any prior surgeries.  Father did die from a stroke in his 50s.  Patient is aware of his recently diagnosed prediabetes and has been working on low carbohydrate diet.  Denies personal history of heart attack, stroke, cancer, DVT, PE, peptic ulcer disease or sleep apnea.  Notes that he sleeps better if he is up right at 30 degree angle.  Thinks this  helps with his reflux and snoring..  Questions answered to verbalize satisfaction.    Past Medical History     Past Medical History:  No date: Anxiety  05/31/2025: GERD (gastroesophageal reflux disease)  05/31/2025: Insomnia  04/2025: Prediabetes     Patient Active Problem List    Diagnosis Date Noted    Acute cholecystitis 05/31/2025     Priority: Medium    GERD (gastroesophageal reflux disease) 05/31/2025     Priority: Medium    Insomnia 05/31/2025     Priority: Medium    Prediabetes 04/2025     Priority: Medium     Surgical History     Past Surgical History:   Procedure Laterality Date    NO PAST SURGERIES       Family History      Family History   Problem Relation Age of Onset    Schizophrenia Mother     Hypertension Father     Gout Father     Cerebrovascular Disease Father 50 - 59        passed from CVA in 50's    Gout Brother     Gout Brother       Social History      Social History     Tobacco Use    Smoking status: Never     Passive exposure: Never    Smokeless tobacco: Never   Vaping Use    Vaping status: Never Used   Substance Use Topics    Alcohol use: Yes     Comment: 2 times per week will binge drink    Drug use: Yes     Types: Marijuana     Comment: edibles      Allergies   No Known Allergies  Prior to Admission Medications      Prior to Admission Medications   Prescriptions Last Dose Informant Patient Reported? Taking?   acetaminophen (TYLENOL) 500 MG tablet Past Week  Yes Yes   Sig: Take 500-1,000 mg by mouth every 6 hours as needed for mild pain.   calcium carbonate (TUMS) 500 MG chewable tablet 5/31/2025 Morning  Yes Yes   Sig: Take 1 chew tab by mouth 2 times daily as needed for heartburn.   omeprazole (PRILOSEC OTC) 20 MG EC tablet 5/30/2025 Morning  Yes Yes   Sig: Take 20 mg by mouth daily.   simethicone (MYLICON) 80 MG chewable tablet 5/31/2025 Morning  Yes Yes   Sig: Take 80 mg by mouth every 6 hours as needed for flatulence or cramping.   traZODone (DESYREL) 50 MG tablet Past Week  No Yes    Sig: Take 0.5 tablets (25 mg) by mouth at bedtime.      Facility-Administered Medications: None      Review of Systems     A 12 point comprehensive review of systems was negative except as noted above in HPI.    PHYSICAL EXAMINATION     Vitals      Temp:  [97.1  F (36.2  C)] 97.1  F (36.2  C)  Pulse:  [61-82] 74  Resp:  [30] 30  BP: (150-167)/() 156/116  SpO2:  [98 %-100 %] 99 %    Examination     Constitutional: awake, alert, cooperative, no apparent distress, and appears stated age, moderately obese, and mildly anxious  Eyes: Lids and lashes normal, pupils equal, round and reactive to light, extra ocular muscles intact, sclera clear, conjunctiva normal  ENT: Normocephalic, without obvious abnormality, atraumatic, sinuses nontender on palpation, external ears without lesions, oral pharynx with moist mucous membranes, tonsils without erythema or exudates, gums normal and good dentition.  Hematologic / Lymphatic: no cervical lymphadenopathy and no supraclavicular lymphadenopathy  Respiratory: No increased work of breathing, good air exchange, clear to auscultation bilaterally, no crackles or wheezing  Cardiovascular: Normal apical impulse, regular rate and rhythm, normal S1 and S2, no S3 or S4, and no murmur noted  GI: No scars, normal bowel sounds, soft, non-distended, non-tender, no masses palpated, no hepatosplenomegally  Skin: normal skin color, texture, turgor, no redness, warmth, or swelling, and no rashes  Musculoskeletal: There is no redness, warmth, or swelling of the joints.  Full range of motion noted.  Motor strength is 5 out of 5 all extremities bilaterally.  Tone is normal. no lower extremity pitting edema present  Neurologic: Cranial nerves II-XII are grossly intact. Sensory:  Sensory intact Deep Tendon Reflexes:  Reflexes are intact and symmetrical bilaterally  Neuropsychiatric: General: normal, calm, and normal eye contact Level of consciousness: alert / normal Affect: anxious Orientation:  oriented to self, place, time and situation Memory and insight: normal, memory for past and recent events intact, and thought process normal      Pertinent Lab     Results for orders placed or performed during the hospital encounter of 05/31/25 (from the past 24 hours)   CBC with platelets + differential    Narrative    The following orders were created for panel order CBC with platelets + differential.  Procedure                               Abnormality         Status                     ---------                               -----------         ------                     CBC with platelets and ...[[id number]]                      Final result                 Please view results for these tests on the individual orders.   Basic metabolic panel   Result Value Ref Range    Sodium 143 135 - 145 mmol/L    Potassium 3.7 3.4 - 5.3 mmol/L    Chloride 102 98 - 107 mmol/L    Carbon Dioxide (CO2) 26 22 - 29 mmol/L    Anion Gap 15 7 - 15 mmol/L    Urea Nitrogen 17.5 6.0 - 20.0 mg/dL    Creatinine 1.12 0.67 - 1.17 mg/dL    GFR Estimate 87 >60 mL/min/1.73m2    Calcium 10.5 (H) 8.8 - 10.4 mg/dL    Glucose 130 (H) 70 - 99 mg/dL   Hepatic function panel   Result Value Ref Range    Protein Total 8.0 6.4 - 8.3 g/dL    Albumin 5.2 3.5 - 5.2 g/dL    Bilirubin Total 1.0 <=1.2 mg/dL    Alkaline Phosphatase 41 40 - 150 U/L    AST 37 0 - 45 U/L    ALT 77 (H) 0 - 70 U/L    Bilirubin Direct 0.26 0.00 - 0.45 mg/dL   Lipase   Result Value Ref Range    Lipase 60 13 - 60 U/L   Lactic Acid Whole Blood with 1X Repeat in 2 HR when >2   Result Value Ref Range    Lactic Acid, Initial 1.7 0.7 - 2.0 mmol/L   CBC with platelets and differential   Result Value Ref Range    WBC Count 10.2 4.0 - 11.0 10e3/uL    RBC Count 5.81 4.40 - 5.90 10e6/uL    Hemoglobin 17.1 13.3 - 17.7 g/dL    Hematocrit 51.3 40.0 - 53.0 %    MCV 88 78 - 100 fL    MCH 29.4 26.5 - 33.0 pg    MCHC 33.3 31.5 - 36.5 g/dL    RDW 12.6 10.0 - 15.0 %    Platelet Count 190 150 - 450  10e3/uL    % Neutrophils 80 %    % Lymphocytes 16 %    % Monocytes 4 %    % Eosinophils 0 %    % Basophils 0 %    % Immature Granulocytes 0 %    NRBCs per 100 WBC 0 <1 /100    Absolute Neutrophils 8.1 1.6 - 8.3 10e3/uL    Absolute Lymphocytes 1.6 0.8 - 5.3 10e3/uL    Absolute Monocytes 0.4 0.0 - 1.3 10e3/uL    Absolute Eosinophils 0.0 0.0 - 0.7 10e3/uL    Absolute Basophils 0.0 0.0 - 0.2 10e3/uL    Absolute Immature Granulocytes 0.0 <=0.4 10e3/uL    Absolute NRBCs 0.0 10e3/uL   CTA Chest Abdomen Pelvis w Contrast    Narrative    EXAM: CTA CHEST ABDOMEN PELVIS W CONTRAST  LOCATION: [facility]  DATE: 5/31/2025    INDICATION: Abdominal pain with radiation to the back, rule out dissection  COMPARISON: CT 6/23/2022  TECHNIQUE: CT angiogram chest abdomen pelvis during arterial phase of injection of IV contrast. 2D and 3D MIP reconstructions were performed by the CT technologist. Dose reduction techniques were used.   CONTRAST: Isovue  370 90mL    FINDINGS:   CT ANGIOGRAM CHEST, ABDOMEN, AND PELVIS: No aneurysm, intramural hematoma, or dissection of the aorta. Patent great vessels, major abdominal branches, and proximal lower extremity arteries without significant stenosis. No evidence of pulmonary embolism.    LUNGS AND PLEURA: Normal.    MEDIASTINUM/AXILLAE: Normal.    CORONARY ARTERY CALCIFICATION: None.    HEPATOBILIARY: Hepatic steatosis. No biliary duct dilatation. Noncalcified gallbladder stone in the fundus.    PANCREAS: Normal.    SPLEEN: Normal.    ADRENAL GLANDS: Normal.    KIDNEYS/BLADDER: Normal.    BOWEL: Normal, including appendix.    LYMPH NODES: Normal.    PELVIC ORGANS: Normal.    MUSCULOSKELETAL: Chronic bilateral L5 spondylolysis with mild spondylolisthesis.      Impression    IMPRESSION:  1.  No evidence of acute aortic syndrome.   2.  Hepatic steatosis.  3.  Cholelithiasis.       UA with Microscopic reflex to Culture    Specimen: Urine, Midstream   Result Value Ref Range     Color Urine Colorless Colorless, Straw, Light Yellow, Yellow    Appearance Urine Clear Clear    Glucose Urine Negative Negative mg/dL    Bilirubin Urine Negative Negative    Ketones Urine 5 (A) Negative mg/dL    Specific Gravity Urine 1.015 1.005 - 1.030    Blood Urine Negative Negative    pH Urine 7.0 5.0 - 7.0    Protein Albumin Urine Negative Negative mg/dL    Urobilinogen Urine <2.0 <2.0 mg/dL    Nitrite Urine Negative Negative    Leukocyte Esterase Urine Negative Negative    RBC Urine <1 <=2 /HPF    WBC Urine <1 <=5 /HPF    Narrative    Urine Culture not indicated  Urine Culture not indicated   US Abdomen Limited    Narrative    EXAM: US ABDOMEN LIMITED  LOCATION: [facility]  DATE: 5/31/2025    INDICATION: Upper abdominal pain with radiation to the back.  COMPARISON: Same day CTA chest, abdomen and pelvis.  TECHNIQUE: Limited abdominal ultrasound.    FINDINGS:    GALLBLADDER: Echogenic stones are seen within distended gallbladder with increased wall thickness measuring up to 4 mm and pericholecystic fluid measuring 7.2 x 0.5 x 1.8 cm. Areas of comet tail artifacts seen in the gallbladder. No sonographic Morgan's   sign.    BILE DUCTS: No intrahepatic biliary dilatation. The common duct is obscured by bowel gas.    LIVER: Increased echogenicity with smooth contour. Areas of decreased echogenicity seen adjacent to the gallbladder fossa compatible with focal fatty change. The portal vein is patent with flow in the normal direction.    RIGHT KIDNEY: No hydronephrosis.    PANCREAS: The visualized portions are normal.    No ascites.        Impression    IMPRESSION:    1.  Cholelithiasis and adenomyomatosis.    2.  Gallbladder is distended with pericholecystic edema. Despite lack of reported sonographic Morgan sign in this patient reportedly premedicated for pain, acute cholecystitis is suspected.    3.  Hepatic steatosis.           Pertinent Radiology     Radiology Results:   Recent Results  (from the past 24 hours)   CTA Chest Abdomen Pelvis w Contrast    Narrative    EXAM: CTA CHEST ABDOMEN PELVIS W CONTRAST  LOCATION: [facility]  DATE: 5/31/2025    INDICATION: Abdominal pain with radiation to the back, rule out dissection  COMPARISON: CT 6/23/2022  TECHNIQUE: CT angiogram chest abdomen pelvis during arterial phase of injection of IV contrast. 2D and 3D MIP reconstructions were performed by the CT technologist. Dose reduction techniques were used.   CONTRAST: Isovue  370 90mL    FINDINGS:   CT ANGIOGRAM CHEST, ABDOMEN, AND PELVIS: No aneurysm, intramural hematoma, or dissection of the aorta. Patent great vessels, major abdominal branches, and proximal lower extremity arteries without significant stenosis. No evidence of pulmonary embolism.    LUNGS AND PLEURA: Normal.    MEDIASTINUM/AXILLAE: Normal.    CORONARY ARTERY CALCIFICATION: None.    HEPATOBILIARY: Hepatic steatosis. No biliary duct dilatation. Noncalcified gallbladder stone in the fundus.    PANCREAS: Normal.    SPLEEN: Normal.    ADRENAL GLANDS: Normal.    KIDNEYS/BLADDER: Normal.    BOWEL: Normal, including appendix.    LYMPH NODES: Normal.    PELVIC ORGANS: Normal.    MUSCULOSKELETAL: Chronic bilateral L5 spondylolysis with mild spondylolisthesis.      Impression    IMPRESSION:  1.  No evidence of acute aortic syndrome.   2.  Hepatic steatosis.  3.  Cholelithiasis.       US Abdomen Limited    Narrative    EXAM: US ABDOMEN LIMITED  LOCATION: [facility]  DATE: 5/31/2025    INDICATION: Upper abdominal pain with radiation to the back.  COMPARISON: Same day CTA chest, abdomen and pelvis.  TECHNIQUE: Limited abdominal ultrasound.    FINDINGS:    GALLBLADDER: Echogenic stones are seen within distended gallbladder with increased wall thickness measuring up to 4 mm and pericholecystic fluid measuring 7.2 x 0.5 x 1.8 cm. Areas of comet tail artifacts seen in the gallbladder. No sonographic Morgan's    sign.    BILE DUCTS: No intrahepatic biliary dilatation. The common duct is obscured by bowel gas.    LIVER: Increased echogenicity with smooth contour. Areas of decreased echogenicity seen adjacent to the gallbladder fossa compatible with focal fatty change. The portal vein is patent with flow in the normal direction.    RIGHT KIDNEY: No hydronephrosis.    PANCREAS: The visualized portions are normal.    No ascites.        Impression    IMPRESSION:    1.  Cholelithiasis and adenomyomatosis.    2.  Gallbladder is distended with pericholecystic edema. Despite lack of reported sonographic Morgan sign in this patient reportedly premedicated for pain, acute cholecystitis is suspected.    3.  Hepatic steatosis.                   Keaton Rowe MD  [facility]   Phone: #[phone]

## 2025-06-01 NOTE — OP NOTE
Name:  Anastacio Li  PCP:  Madison Harden  Procedure Date:  5/31/2025 - 6/1/2025      Procedure(s):  CHOLECYSTECTOMY, LAPAROSCOPIC    Pre-Procedure Diagnosis:  Acute cholecystitis [K81.0]     Post-Procedure Diagnosis:    Acute on chronic cholecystitis    Surgeon(s):  Sai Tomlinson DO    Circulator: Joni Lorenzo RN  Scrub Person: Staci Phelan    Anesthesia Type:  GET      Findings:  Acute on chronic cholecystitis with edema  Gallstone    Operative Report:    The patient was taken to the operating room and placed in a supine position.  Endotracheal intubation was performed.  SCDs were placed on bilateral lower extremities.  Antibiotics were given prior to skin incision.  The abdomen was prepped and draped in a sterile fashion.    I began the first portion of the procedure by injecting quarter percent Marcaine with epinephrine into the supraumbilical position.  I then made a 5 mm transverse incision above the umbilicus and established a pneumoperitoneum using a Veress needle technique.  Once the pneumoperitoneum was established,I placed a 5 mm trocar with a 5 mm 30  camera into the abdomen.  The surrounding structures were scrutinized for any injuries upon entry.  I did not find any. I placed an 11 mm trocar in the subxiphoid position as well as 2 right upper quadrant 5 mm trochars under direct visualization.  All trochars were placed after local anesthetic was injected to the fascial layers.      I then had my assistant retract the gallbladder cephalad and I persisted to dissect out the cystic duct and cystic artery in the standard fashion using blunt dissection and Bovie electrocautery.  Once I obtained a critical view I then placed several 10 mm titanium clips on the cystic duct 3 distally and one at the gallbladder and infundibulum junction.  I then placed 2 clips across the cystic artery.  Next I transected both the cystic duct and cystic artery with sharp dissection and removed the gallbladder off  the gallbladder fossa using Bovie electrocautery.  I then placed the gallbladder into an Endo Catch bag and brought it out through the subxiphoid port site incision.  Next I achieved hemostasis using Bovie electrocautery and scrutinized the surgical area for any ongoing bleeding.  None was found.   Once this was complete I removed all trochars and desufflated the abdomen.  The epigastric port site incision fascial defect was closed with a 0 Vicryl suture.  I then injected local anesthetic and all fascial layers and reapproximated the skin edges of all 4 trocar sites with 4-0 Monocryl subcuticular sutures.  The wounds were then cleaned and covered with dry sterile dressings.  All sponge counts and needle counts were correct at the end of the procedure.    Estimated Blood Loss:   5 cc    Specimens:    ID Type Source Tests Collected by Time Destination   1 : Gallbladder Tissue Gallbladder SURGICAL PATHOLOGY EXAM Sai Tomlinson DO 6/1/2025  9:36 AM           Drains:   GI Enteral Access Device Mouth, right Gastric 16 fr (Active)       Complications:    None    Sai Tomlinson DO

## 2025-06-01 NOTE — ANESTHESIA CARE TRANSFER NOTE
Patient: Anastacio Li    Procedure: Procedure(s):  CHOLECYSTECTOMY, LAPAROSCOPIC       Diagnosis: Acute cholecystitis [K81.0]  Diagnosis Additional Information: No value filed.    Anesthesia Type:   General     Note:    Oropharynx: oropharynx clear of all foreign objects and spontaneously breathing  Level of Consciousness: awake and drowsy  Oxygen Supplementation: face mask    Independent Airway: airway patency satisfactory and stable  Dentition: dentition unchanged  Vital Signs Stable: post-procedure vital signs reviewed and stable  Report to RN Given: handoff report given  Patient transferred to: PACU    Handoff Report: Identifed the Patient, Identified the Reponsible Provider, Reviewed the pertinent medical history, Discussed the surgical course, Reviewed Intra-OP anesthesia mangement and issues during anesthesia, Set expectations for post-procedure period and Allowed opportunity for questions and acknowledgement of understanding      Vitals:  Vitals Value Taken Time   /61 06/01/25 10:24   Temp 36.2  C (97.2  F) 06/01/25 10:24   Pulse 94 06/01/25 10:30   Resp 35 06/01/25 10:30   SpO2 100 % 06/01/25 10:30   Vitals shown include unfiled device data.    Electronically Signed By: VANESSA Malhotra CRNA  June 1, 2025  10:31 AM  
Discharged

## 2025-06-01 NOTE — PROGRESS NOTES
PRIMARY DIAGNOSIS: ACUTE PAIN  OUTPATIENT/OBSERVATION GOALS TO BE MET BEFORE DISCHARGE:  1. Pain Status: Pain free.    2. Return to near baseline physical activity: Yes    3. Cleared for discharge by consultants (if involved): No    Discharge Planner Nurse   Safe discharge environment identified: Yes  Barriers to discharge: Yes       Entered by: Arturo Michelle RN 06/01/2025 1:11 AM     Please review provider order for any additional goals.   Nurse to notify provider when observation goals have been met and patient is ready for discharge.

## 2025-06-01 NOTE — PLAN OF CARE
Goal Outcome Evaluation:     Problem: Adult Inpatient Plan of Care  Goal: Optimal Comfort and Wellbeing  Outcome: Progressing   Patient is resting comfortably. No complaints of pain. Sipping on water. Instructed patient to order food. No void urge. Dressings are dry and intact. Stomach is taut.

## 2025-06-01 NOTE — PLAN OF CARE
Patient tolerated Full liquid diet. No nausea/ vomitting. Ambulating  independently. Denies pain. Cleared for discharge. PIV removed. AVS discussed with patient. Verbalized understanding and satisfaction. Left via private vehicle.

## 2025-06-01 NOTE — ED NOTES
[facility] ED Handoff Report    ED Chief Complaint: Abdominal pain, nausea, & vomiting    ED Diagnosis:  (K81.0) Acute cholecystitis  Comment: patient states they have a history of gallstones  Plan: Surgery on 6/1/2025, IV antibiotics and pain control       PMH:    Past Medical History:   Diagnosis Date    GERD (gastroesophageal reflux disease) 05/31/2025    Insomnia 05/31/2025    Prediabetes 04/2025        Code Status:  No Order     Falls Risk: No Band: Not applicable    Current Living Situation/Residence: lives in a house     Elimination Status: Continent: Yes     Activity Level: independent to stand by assist    Patient's Preferred Language:  English     Needed: No    Vital Signs:  BP (!) 150/83   Pulse 68   Temp 97.1  F (36.2  C) (Temporal)   Resp 30   Ht 1.524 m (5')   Wt 77.1 kg (170 lb)   SpO2 100%   BMI 33.20 kg/m       Cardiac Rhythm: N/A    Pain Score: 0/10    Is the Patient Confused:  No    Last Food or Drink: 05/31/25 at food around 0800, water around 2030    Assessment and Plan of Care:  Patient arrived with LUQ pain that radiated to his back, with nausea and vomiting. Patient was given IV Zofran and Dilaudid for pain control. Labs and imaging were done.     Tests Performed: Done: Labs and Imaging    Treatments Provided:  Pain medications, IV antibiotics,     Family Dynamics/Concerns: No    Belongings Checklist Done and Signed by Patient: Yes    Belongings Sent with Patient: clothes, shoes, cell phone, wallet,     Boarding medications sent with patient: n/a    Additional Information: n/a    RN: Tanya Crow RN   5/31/2025 8:26 PM

## 2025-06-01 NOTE — ANESTHESIA PROCEDURE NOTES
Airway       Patient location during procedure: OR       Procedure Start/Stop Times: 6/1/2025 9:10 AM  Staff -        Anesthesiologist:  Burt Almaguer MD       CRNA: Jalen Duarte APRN CRNA       Performed By: CRNA  Consent for Airway        Urgency: elective  Indications and Patient Condition       Indications for airway management: lexis-procedural       Induction type:intravenous       Mask difficulty assessment: 1 - vent by mask    Final Airway Details       Final airway type: endotracheal airway       Successful airway: ETT - single  Endotracheal Airway Details        ETT size (mm): 7.5       Cuffed: yes       Cuff volume (mL): 5       Successful intubation technique: direct laryngoscopy       DL Blade Type: Christianson 2       Grade View of Cords: 1       Adjucts: stylet       Position: Right       Measured from: lips       Secured at (cm): 21       Bite block used: None    Post intubation assessment        Placement verified by: capnometry, equal breath sounds and chest rise        Secured with: tape       Ease of procedure: easy       Dentition: Intact and Unchanged       Dental guard used and removed. Dental Guard Type: Standard White.    Medication(s) Administered   Medication Administration Time: 6/1/2025 9:10 AM

## 2025-06-03 LAB
PATH REPORT.COMMENTS IMP SPEC: NORMAL
PATH REPORT.COMMENTS IMP SPEC: NORMAL
PATH REPORT.FINAL DX SPEC: NORMAL
PATH REPORT.GROSS SPEC: NORMAL
PATH REPORT.MICROSCOPIC SPEC OTHER STN: NORMAL
PATH REPORT.RELEVANT HX SPEC: NORMAL
PHOTO IMAGE: NORMAL

## 2025-06-03 PROCEDURE — 88304 TISSUE EXAM BY PATHOLOGIST: CPT | Mod: 26 | Performed by: PATHOLOGY

## 2025-06-04 ENCOUNTER — TELEPHONE (OUTPATIENT)
Dept: SURGERY | Facility: CLINIC | Age: 35
End: 2025-06-04
Payer: COMMERCIAL

## 2025-06-04 NOTE — TELEPHONE ENCOUNTER
[facility] Post-Op Phone Call                    Surgeon: Sai Tomlinson DO    Date of Surgery: 06/01/2025  Surgery: Laparoscopic Cholecystectomy  Discharge Date: 06/01/2025    Date/Time Called:   Date: 6/4/2025 Time: 9:01 AM   Attempt: First    RN called patient to complete post-op call. No answer and left message for patient to call clinic for any questions or concerns regarding recovery.    Lydia GOODMAN RN, BSN

## 2025-06-13 ENCOUNTER — RESULTS FOLLOW-UP (OUTPATIENT)
Dept: INTERNAL MEDICINE | Facility: CLINIC | Age: 35
End: 2025-06-13

## 2025-06-13 PROBLEM — K81.0 ACUTE CHOLECYSTITIS: Status: RESOLVED | Noted: 2025-05-31 | Resolved: 2025-06-13

## 2025-07-14 DIAGNOSIS — F51.01 PRIMARY INSOMNIA: ICD-10-CM

## 2025-07-14 RX ORDER — TRAZODONE HYDROCHLORIDE 50 MG/1
25 TABLET ORAL AT BEDTIME
Qty: 45 TABLET | Refills: 1 | Status: SHIPPED | OUTPATIENT
Start: 2025-07-14

## 2025-07-29 ENCOUNTER — APPOINTMENT (OUTPATIENT)
Dept: CT IMAGING | Facility: CLINIC | Age: 35
End: 2025-07-29
Attending: EMERGENCY MEDICINE
Payer: COMMERCIAL

## 2025-07-29 ENCOUNTER — HOSPITAL ENCOUNTER (INPATIENT)
Facility: CLINIC | Age: 35
LOS: 1 days | Discharge: HOME | End: 2025-07-29
Attending: EMERGENCY MEDICINE | Admitting: INTERNAL MEDICINE
Payer: COMMERCIAL

## 2025-07-29 VITALS
SYSTOLIC BLOOD PRESSURE: 125 MMHG | OXYGEN SATURATION: 96 % | TEMPERATURE: 98.5 F | DIASTOLIC BLOOD PRESSURE: 83 MMHG | BODY MASS INDEX: 32.2 KG/M2 | HEIGHT: 62 IN | HEART RATE: 68 BPM | RESPIRATION RATE: 16 BRPM | WEIGHT: 175 LBS

## 2025-07-29 DIAGNOSIS — K56.609 SBO (SMALL BOWEL OBSTRUCTION) (H): Primary | ICD-10-CM

## 2025-07-29 LAB
ALBUMIN SERPL BCG-MCNC: 4.9 G/DL (ref 3.5–5.2)
ALP SERPL-CCNC: 37 U/L (ref 40–150)
ALT SERPL W P-5'-P-CCNC: 43 U/L (ref 0–70)
ANION GAP SERPL CALCULATED.3IONS-SCNC: 12 MMOL/L (ref 7–15)
AST SERPL W P-5'-P-CCNC: 30 U/L (ref 0–45)
ATRIAL RATE - MUSE: 69 BPM
BASOPHILS # BLD AUTO: 0 10E3/UL (ref 0–0.2)
BASOPHILS NFR BLD AUTO: 0 %
BILIRUB SERPL-MCNC: 1 MG/DL
BUN SERPL-MCNC: 13 MG/DL (ref 6–20)
CALCIUM SERPL-MCNC: 9.9 MG/DL (ref 8.8–10.4)
CHLORIDE SERPL-SCNC: 103 MMOL/L (ref 98–107)
CREAT SERPL-MCNC: 1.09 MG/DL (ref 0.67–1.17)
DIASTOLIC BLOOD PRESSURE - MUSE: NORMAL MMHG
EGFRCR SERPLBLD CKD-EPI 2021: 90 ML/MIN/1.73M2
EOSINOPHIL # BLD AUTO: 0.2 10E3/UL (ref 0–0.7)
EOSINOPHIL NFR BLD AUTO: 3 %
ERYTHROCYTE [DISTWIDTH] IN BLOOD BY AUTOMATED COUNT: 13 % (ref 10–15)
GLUCOSE SERPL-MCNC: 120 MG/DL (ref 70–99)
HCO3 SERPL-SCNC: 25 MMOL/L (ref 22–29)
HCT VFR BLD AUTO: 46.9 % (ref 40–53)
HGB BLD-MCNC: 15.6 G/DL (ref 13.3–17.7)
IMM GRANULOCYTES # BLD: 0 10E3/UL
IMM GRANULOCYTES NFR BLD: 0 %
INTERPRETATION ECG - MUSE: NORMAL
LACTATE SERPL-SCNC: 0.6 MMOL/L (ref 0.7–2)
LIPASE SERPL-CCNC: 56 U/L (ref 13–60)
LYMPHOCYTES # BLD AUTO: 1.6 10E3/UL (ref 0.8–5.3)
LYMPHOCYTES NFR BLD AUTO: 21 %
MCH RBC QN AUTO: 29.2 PG (ref 26.5–33)
MCHC RBC AUTO-ENTMCNC: 33.3 G/DL (ref 31.5–36.5)
MCV RBC AUTO: 88 FL (ref 78–100)
MONOCYTES # BLD AUTO: 0.7 10E3/UL (ref 0–1.3)
MONOCYTES NFR BLD AUTO: 9 %
NEUTROPHILS # BLD AUTO: 5.3 10E3/UL (ref 1.6–8.3)
NEUTROPHILS NFR BLD AUTO: 67 %
NRBC # BLD AUTO: 0 10E3/UL
NRBC BLD AUTO-RTO: 0 /100
P AXIS - MUSE: 77 DEGREES
PLATELET # BLD AUTO: 175 10E3/UL (ref 150–450)
POTASSIUM SERPL-SCNC: 3.9 MMOL/L (ref 3.4–5.3)
PR INTERVAL - MUSE: 184 MS
PROT SERPL-MCNC: 7.3 G/DL (ref 6.4–8.3)
QRS DURATION - MUSE: 104 MS
QT - MUSE: 372 MS
QTC - MUSE: 398 MS
R AXIS - MUSE: 95 DEGREES
RBC # BLD AUTO: 5.34 10E6/UL (ref 4.4–5.9)
SODIUM SERPL-SCNC: 140 MMOL/L (ref 135–145)
SYSTOLIC BLOOD PRESSURE - MUSE: NORMAL MMHG
T AXIS - MUSE: 47 DEGREES
TROPONIN T SERPL HS-MCNC: 11 NG/L
TROPONIN T SERPL HS-MCNC: 8 NG/L
VENTRICULAR RATE- MUSE: 69 BPM
WBC # BLD AUTO: 7.9 10E3/UL (ref 4–11)

## 2025-07-29 PROCEDURE — 82247 BILIRUBIN TOTAL: CPT | Performed by: EMERGENCY MEDICINE

## 2025-07-29 PROCEDURE — 74174 CTA ABD&PLVS W/CONTRAST: CPT

## 2025-07-29 PROCEDURE — 99235 HOSP IP/OBS SAME DATE MOD 70: CPT | Performed by: INTERNAL MEDICINE

## 2025-07-29 PROCEDURE — 96361 HYDRATE IV INFUSION ADD-ON: CPT

## 2025-07-29 PROCEDURE — 85025 COMPLETE CBC W/AUTO DIFF WBC: CPT | Performed by: EMERGENCY MEDICINE

## 2025-07-29 PROCEDURE — 93005 ELECTROCARDIOGRAM TRACING: CPT | Performed by: EMERGENCY MEDICINE

## 2025-07-29 PROCEDURE — 258N000003 HC RX IP 258 OP 636: Performed by: INTERNAL MEDICINE

## 2025-07-29 PROCEDURE — 84484 ASSAY OF TROPONIN QUANT: CPT | Performed by: EMERGENCY MEDICINE

## 2025-07-29 PROCEDURE — 80053 COMPREHEN METABOLIC PANEL: CPT | Performed by: EMERGENCY MEDICINE

## 2025-07-29 PROCEDURE — 99207 PR APP CREDIT; MD BILLING SHARED VISIT: CPT | Performed by: STUDENT IN AN ORGANIZED HEALTH CARE EDUCATION/TRAINING PROGRAM

## 2025-07-29 PROCEDURE — G0378 HOSPITAL OBSERVATION PER HR: HCPCS

## 2025-07-29 PROCEDURE — 250N000013 HC RX MED GY IP 250 OP 250 PS 637: Performed by: EMERGENCY MEDICINE

## 2025-07-29 PROCEDURE — 83605 ASSAY OF LACTIC ACID: CPT | Performed by: EMERGENCY MEDICINE

## 2025-07-29 PROCEDURE — 96374 THER/PROPH/DIAG INJ IV PUSH: CPT | Mod: 59

## 2025-07-29 PROCEDURE — 250N000013 HC RX MED GY IP 250 OP 250 PS 637: Performed by: INTERNAL MEDICINE

## 2025-07-29 PROCEDURE — 71275 CT ANGIOGRAPHY CHEST: CPT

## 2025-07-29 PROCEDURE — 120N000001 HC R&B MED SURG/OB

## 2025-07-29 PROCEDURE — 250N000011 HC RX IP 250 OP 636: Performed by: EMERGENCY MEDICINE

## 2025-07-29 PROCEDURE — 99207 PR NO BILLABLE SERVICE THIS VISIT: CPT | Performed by: STUDENT IN AN ORGANIZED HEALTH CARE EDUCATION/TRAINING PROGRAM

## 2025-07-29 PROCEDURE — 83690 ASSAY OF LIPASE: CPT | Performed by: EMERGENCY MEDICINE

## 2025-07-29 PROCEDURE — 99285 EMERGENCY DEPT VISIT HI MDM: CPT | Mod: 25

## 2025-07-29 PROCEDURE — 258N000003 HC RX IP 258 OP 636: Performed by: EMERGENCY MEDICINE

## 2025-07-29 PROCEDURE — 96375 TX/PRO/DX INJ NEW DRUG ADDON: CPT

## 2025-07-29 PROCEDURE — 96376 TX/PRO/DX INJ SAME DRUG ADON: CPT

## 2025-07-29 PROCEDURE — 36415 COLL VENOUS BLD VENIPUNCTURE: CPT | Performed by: EMERGENCY MEDICINE

## 2025-07-29 PROCEDURE — 99222 1ST HOSP IP/OBS MODERATE 55: CPT | Mod: 24

## 2025-07-29 RX ORDER — HYDROMORPHONE HYDROCHLORIDE 1 MG/ML
0.5 INJECTION, SOLUTION INTRAMUSCULAR; INTRAVENOUS; SUBCUTANEOUS ONCE
Refills: 0 | Status: COMPLETED | OUTPATIENT
Start: 2025-07-29 | End: 2025-07-29

## 2025-07-29 RX ORDER — ONDANSETRON 4 MG/1
4 TABLET, ORALLY DISINTEGRATING ORAL EVERY 6 HOURS PRN
Status: DISCONTINUED | OUTPATIENT
Start: 2025-07-29 | End: 2025-07-29 | Stop reason: HOSPADM

## 2025-07-29 RX ORDER — LIDOCAINE 40 MG/G
CREAM TOPICAL
Status: DISCONTINUED | OUTPATIENT
Start: 2025-07-29 | End: 2025-07-29 | Stop reason: HOSPADM

## 2025-07-29 RX ORDER — NALOXONE HYDROCHLORIDE 0.4 MG/ML
0.2 INJECTION, SOLUTION INTRAMUSCULAR; INTRAVENOUS; SUBCUTANEOUS
Status: DISCONTINUED | OUTPATIENT
Start: 2025-07-29 | End: 2025-07-29 | Stop reason: HOSPADM

## 2025-07-29 RX ORDER — AMOXICILLIN 250 MG
2 CAPSULE ORAL 2 TIMES DAILY PRN
Status: DISCONTINUED | OUTPATIENT
Start: 2025-07-29 | End: 2025-07-29 | Stop reason: HOSPADM

## 2025-07-29 RX ORDER — MORPHINE SULFATE 2 MG/ML
1 INJECTION, SOLUTION INTRAMUSCULAR; INTRAVENOUS
Refills: 0 | Status: DISCONTINUED | OUTPATIENT
Start: 2025-07-29 | End: 2025-07-29 | Stop reason: HOSPADM

## 2025-07-29 RX ORDER — ONDANSETRON 2 MG/ML
4 INJECTION INTRAMUSCULAR; INTRAVENOUS EVERY 6 HOURS PRN
Status: DISCONTINUED | OUTPATIENT
Start: 2025-07-29 | End: 2025-07-29 | Stop reason: HOSPADM

## 2025-07-29 RX ORDER — IOPAMIDOL 755 MG/ML
100 INJECTION, SOLUTION INTRAVASCULAR ONCE
Status: COMPLETED | OUTPATIENT
Start: 2025-07-29 | End: 2025-07-29

## 2025-07-29 RX ORDER — PROCHLORPERAZINE MALEATE 10 MG
10 TABLET ORAL EVERY 6 HOURS PRN
Status: DISCONTINUED | OUTPATIENT
Start: 2025-07-29 | End: 2025-07-29 | Stop reason: HOSPADM

## 2025-07-29 RX ORDER — ACETAMINOPHEN 650 MG/1
650 SUPPOSITORY RECTAL EVERY 4 HOURS PRN
Status: DISCONTINUED | OUTPATIENT
Start: 2025-07-29 | End: 2025-07-29 | Stop reason: HOSPADM

## 2025-07-29 RX ORDER — IBUPROFEN 200 MG
200 TABLET ORAL EVERY 6 HOURS PRN
COMMUNITY

## 2025-07-29 RX ORDER — SODIUM CHLORIDE, SODIUM LACTATE, POTASSIUM CHLORIDE, CALCIUM CHLORIDE 600; 310; 30; 20 MG/100ML; MG/100ML; MG/100ML; MG/100ML
INJECTION, SOLUTION INTRAVENOUS CONTINUOUS
Status: DISCONTINUED | OUTPATIENT
Start: 2025-07-29 | End: 2025-07-29

## 2025-07-29 RX ORDER — OXYCODONE HYDROCHLORIDE 5 MG/1
5 TABLET ORAL EVERY 4 HOURS PRN
Refills: 0 | Status: DISCONTINUED | OUTPATIENT
Start: 2025-07-29 | End: 2025-07-29 | Stop reason: HOSPADM

## 2025-07-29 RX ORDER — NALOXONE HYDROCHLORIDE 0.4 MG/ML
0.4 INJECTION, SOLUTION INTRAMUSCULAR; INTRAVENOUS; SUBCUTANEOUS
Status: DISCONTINUED | OUTPATIENT
Start: 2025-07-29 | End: 2025-07-29 | Stop reason: HOSPADM

## 2025-07-29 RX ORDER — ONDANSETRON 2 MG/ML
4 INJECTION INTRAMUSCULAR; INTRAVENOUS ONCE
Status: COMPLETED | OUTPATIENT
Start: 2025-07-29 | End: 2025-07-29

## 2025-07-29 RX ORDER — MAGNESIUM HYDROXIDE/ALUMINUM HYDROXICE/SIMETHICONE 120; 1200; 1200 MG/30ML; MG/30ML; MG/30ML
15 SUSPENSION ORAL ONCE
Status: COMPLETED | OUTPATIENT
Start: 2025-07-29 | End: 2025-07-29

## 2025-07-29 RX ORDER — MORPHINE SULFATE 2 MG/ML
2 INJECTION, SOLUTION INTRAMUSCULAR; INTRAVENOUS
Refills: 0 | Status: DISCONTINUED | OUTPATIENT
Start: 2025-07-29 | End: 2025-07-29 | Stop reason: HOSPADM

## 2025-07-29 RX ORDER — SODIUM CHLORIDE 9 MG/ML
INJECTION, SOLUTION INTRAVENOUS ONCE
Status: COMPLETED | OUTPATIENT
Start: 2025-07-29 | End: 2025-07-29

## 2025-07-29 RX ORDER — ACETAMINOPHEN 325 MG/1
650 TABLET ORAL EVERY 4 HOURS PRN
Status: DISCONTINUED | OUTPATIENT
Start: 2025-07-29 | End: 2025-07-29 | Stop reason: HOSPADM

## 2025-07-29 RX ORDER — AMOXICILLIN 250 MG
1 CAPSULE ORAL 2 TIMES DAILY PRN
Status: DISCONTINUED | OUTPATIENT
Start: 2025-07-29 | End: 2025-07-29 | Stop reason: HOSPADM

## 2025-07-29 RX ADMIN — SODIUM CHLORIDE, SODIUM LACTATE, POTASSIUM CHLORIDE, AND CALCIUM CHLORIDE: .6; .31; .03; .02 INJECTION, SOLUTION INTRAVENOUS at 04:26

## 2025-07-29 RX ADMIN — SODIUM CHLORIDE: 0.9 INJECTION, SOLUTION INTRAVENOUS at 03:50

## 2025-07-29 RX ADMIN — ACETAMINOPHEN 650 MG: 325 TABLET ORAL at 07:55

## 2025-07-29 RX ADMIN — ONDANSETRON 4 MG: 2 INJECTION, SOLUTION INTRAMUSCULAR; INTRAVENOUS at 00:47

## 2025-07-29 RX ADMIN — HYDROMORPHONE HYDROCHLORIDE 0.5 MG: 1 INJECTION, SOLUTION INTRAMUSCULAR; INTRAVENOUS; SUBCUTANEOUS at 01:26

## 2025-07-29 RX ADMIN — ALUMINUM HYDROXIDE, MAGNESIUM HYDROXIDE, AND SIMETHICONE 15 ML: 200; 200; 20 SUSPENSION ORAL at 00:39

## 2025-07-29 RX ADMIN — IOPAMIDOL 100 ML: 755 INJECTION, SOLUTION INTRAVENOUS at 01:52

## 2025-07-29 RX ADMIN — HYDROMORPHONE HYDROCHLORIDE 0.5 MG: 1 INJECTION, SOLUTION INTRAMUSCULAR; INTRAVENOUS; SUBCUTANEOUS at 03:56

## 2025-07-29 ASSESSMENT — ACTIVITIES OF DAILY LIVING (ADL)
ADLS_ACUITY_SCORE: 59
ADLS_ACUITY_SCORE: 59
ADLS_ACUITY_SCORE: 33
ADLS_ACUITY_SCORE: 59
ADLS_ACUITY_SCORE: 33
ADLS_ACUITY_SCORE: 33
ADLS_ACUITY_SCORE: 59
ADLS_ACUITY_SCORE: 33
ADLS_ACUITY_SCORE: 59
ADLS_ACUITY_SCORE: 59

## 2025-07-29 ASSESSMENT — ENCOUNTER SYMPTOMS
ABDOMINAL PAIN: 1
MYALGIAS: 0
CHILLS: 0
FEVER: 0
BACK PAIN: 1
VOMITING: 0
NAUSEA: 1
DYSURIA: 0

## 2025-07-29 ASSESSMENT — COLUMBIA-SUICIDE SEVERITY RATING SCALE - C-SSRS
1. IN THE PAST MONTH, HAVE YOU WISHED YOU WERE DEAD OR WISHED YOU COULD GO TO SLEEP AND NOT WAKE UP?: NO
2. HAVE YOU ACTUALLY HAD ANY THOUGHTS OF KILLING YOURSELF IN THE PAST MONTH?: NO
6. HAVE YOU EVER DONE ANYTHING, STARTED TO DO ANYTHING, OR PREPARED TO DO ANYTHING TO END YOUR LIFE?: NO

## 2025-07-29 NOTE — PROGRESS NOTES
Brief progress note:    Passed some gas this AM, pain better, GS evaluated, ADAT, tolerating clears now will advance diet and if all goes well can discharge today. If not will stay and order gastrografin challenge. Cares otherwise per H&P dated this morning.    Jacky York MD  Hospitalist

## 2025-07-29 NOTE — ED TRIAGE NOTES
Patient presents to the ED complaining of LUQ abdominal pain that is radiating to his back for the past hour.  The pain is making him feel lightheaded.  Unsure when last bowel movement was.  No medication prior to arrival.       Triage Assessment (Adult)       Row Name 07/29/25 0017          Triage Assessment    Airway WDL WDL        Respiratory WDL    Respiratory WDL WDL        Skin Circulation/Temperature WDL    Skin Circulation/Temperature WDL WDL        Cardiac WDL    Cardiac WDL WDL        Peripheral/Neurovascular WDL    Peripheral Neurovascular WDL WDL        Cognitive/Neuro/Behavioral WDL    Cognitive/Neuro/Behavioral WDL WDL

## 2025-07-29 NOTE — ED PROVIDER NOTES
EMERGENCY DEPARTMENT ENCOUNTER      NAME: Anastacio Li  AGE: 34 year old male  YOB: 1990  MRN: [id number]  EVALUATION DATE & TIME: No admission date for patient encounter.    PCP: Madison Harden    ED PROVIDER: aGrcía Davis MD      Chief Complaint   Patient presents with    Abdominal Pain     LUQ         FINAL IMPRESSION:  1. SBO (small bowel obstruction) (H)          ED COURSE & MEDICAL DECISION MAKING:    Pertinent Labs & Imaging studies reviewed. (See chart for details)  34 year old male presents to the Emergency Department for evaluation of periumbilical abdominal pain, nausea but no vomit    Has not stooled today but has passed flatus    Benign abdominal exam    Differential includes choledocholithiasis, pancreatitis, peptic ulcer disease, ACS, colitis, obstruction, AAA, dissection    Hypertensive.  Plan for CTA, GI cocktail, CBC, lipase, metabolic panel    No improvement with GI cocktail.  Dilaudid ordered.  CT imaging independently reviewed by myself shows small bowel obstruction    ED Course as of 07/29/25 0351   Tue Jul 29, 2025   0119 GI cocktail slight improvement in pain patient requesting more pain meds.  Dilaudid ordered.   0319 Lactic Acid(!): 0.6   0319 Troponin T, High Sensitivity: 8       12:22 AM I met the patient and performed my initial interview and exam.  3:31 AM Rechecked and updated patient on labs and imaging.   3:37 AM I spoke with Dr. Main, surgery, regarding the patient's bowel obstruction. No NG tube. Plan to admit.   3:45 AM Spoke with Dr. Becker, Hospitalist, regarding plan for admission.    Medical Decision Making  I independently interpreted the CTA and note small bowel obstruction. See radiology report for final interpretation.  I discussed the care with another health care provider: General Surgery, hospitalist  Admit.    CT Pulmonary Angiogram:CT PA was ordered for reason(s) other than PE.      SEPSIS: None              At the conclusion of the encounter I  discussed the results of all of the tests and the disposition. The questions were answered. The patient or family acknowledged understanding and was agreeable with the care plan.         Voice recognition software used for this note,  any grammatical or spelling errors are non-intentional. Please contact the author of this note directly if you are in need of any clarification.      MEDICATIONS GIVEN IN THE EMERGENCY:  Medications   alum & mag hydroxide-simethicone (MAALOX) suspension 15 mL (15 mLs Oral $Given 7/29/25 0039)   ondansetron (ZOFRAN) injection 4 mg (4 mg Intravenous $Given 7/29/25 0047)   HYDROmorphone (PF) (DILAUDID) injection 0.5 mg (0.5 mg Intravenous $Given 7/29/25 0126)   iopamidol (ISOVUE-370) solution 100 mL (100 mLs Intravenous $Given 7/29/25 0152)   sodium chloride 0.9 % infusion ( Intravenous $New Bag 7/29/25 0350)       NEW PRESCRIPTIONS STARTED AT TODAY'S ER VISIT  New Prescriptions    No medications on file          =================================================================    TRIAGE ASSESSMENT:        HPI    Patient information was obtained from: patient    Use of : N/A       Anastacio Li is a 34 year old male with a pertinent history of GERD, cholecystectomy, who presents to this ED via walk-in for evaluation of abdominal pain.    Earlier this evening, the patient was feeling a bit nauseous so he took omeprazole around 8-9 PM and went to bed. About 1 hour ago, the patient was awoken by diffuse abdominal pain, worse in the left upper quadrant, that came in waves and was consistently worsening. He also was noting some back spasms around his midline lumbar spine area. With the severe pain, he decided to present to the ER. Currently, it is 4/10 but spikes up to 8/10.     He has a history of cholecystectomy earlier this year and has not had any abdominal pain like this since the surgery. He does state that this pain feels like the gallbladder pain, but is in a different location.  "He has a history of gastritis, but hasn't had an endoscopy before. He has occasional aspirin and ibuprofen use.    He had a bowel movement yesterday but none today. He started to experience some congestion on 26-Jul after being outside for a while at a party. He occasionally uses trazodone for sleep. He denies vomiting, breathing trouble, fever, body aches, chills, and dysuria.     His last alcohol use was on 24-Jul-2025.     Per Chart Review:  Admission to [facility] ER on 31-May-2025 for cholecystitis with cholecystectomy. In the ER,  LFTs mildly elevated, ALT 77, ultrasound with cholelithiasis with adenomyomatosis. IV dilaudid helped pain. Admitted for cholecystectomy, which went well. Discharged in stable condition.  Ultrasound abdomen limited with impression as follows: \"Cholelithiasis and adenomyomatosis. Gallbladder is distended with pericholecystic edema. Despite lack of reported sonographic Morgan sign in this patient reportedly premedicated for pain, acute cholecystitis is suspected. Hepatic steatosis.      REVIEW OF SYSTEMS   Review of Systems   Constitutional:  Negative for chills and fever.   HENT:  Positive for congestion.    Respiratory:          Negative for breathing issues   Gastrointestinal:  Positive for abdominal pain (left upper) and nausea. Negative for vomiting.   Genitourinary:  Negative for dysuria.   Musculoskeletal:  Positive for back pain (spasming). Negative for myalgias.        PAST MEDICAL HISTORY:  Past Medical History:   Diagnosis Date    Acute cholecystitis 05/31/2025    Anxiety     GERD (gastroesophageal reflux disease) 05/31/2025    Insomnia 05/31/2025    Prediabetes 04/2025       PAST SURGICAL HISTORY:  Past Surgical History:   Procedure Laterality Date    NO PAST SURGERIES             CURRENT MEDICATIONS:    acetaminophen (TYLENOL) 500 MG tablet  calcium carbonate (TUMS) 500 MG chewable tablet  traZODone (DESYREL) 50 MG tablet        ALLERGIES:  No Known Allergies    FAMILY " HISTORY:  Family History   Problem Relation Age of Onset    Schizophrenia Mother     Hypertension Father     Gout Father     Cerebrovascular Disease Father 50 - 59        passed from CVA in 50's    Gout Brother     Gout Brother        SOCIAL HISTORY:   Social History     Socioeconomic History    Marital status: Single   Tobacco Use    Smoking status: Never     Passive exposure: Never    Smokeless tobacco: Never   Vaping Use    Vaping status: Never Used   Substance and Sexual Activity    Alcohol use: Yes     Comment: 2 times per week will binge drink    Drug use: Yes     Types: Marijuana     Comment: edibles    Sexual activity: Not Currently     Social Drivers of Health     Financial Resource Strain: Low Risk  (6/1/2025)    Financial Resource Strain     Within the past 12 months, have you or your family members you live with been unable to get utilities (heat, electricity) when it was really needed?: No   Food Insecurity: Low Risk  (6/1/2025)    Food Insecurity     Within the past 12 months, did you worry that your food would run out before you got money to buy more?: No     Within the past 12 months, did the food you bought just not last and you didn t have money to get more?: No   Transportation Needs: Low Risk  (6/1/2025)    Transportation Needs     Within the past 12 months, has lack of transportation kept you from medical appointments, getting your medicines, non-medical meetings or appointments, work, or from getting things that you need?: No   Physical Activity: Unknown (4/18/2025)    Exercise Vital Sign     Days of Exercise per Week: 3 days   Stress: Stress Concern Present (4/18/2025)    [place] [employer] - Occupational Stress Questionnaire     Feeling of Stress : Rather much   Social Connections: Unknown (4/18/2025)    Social Connection and Isolation Panel [NHANES]     Frequency of Social Gatherings with Friends and Family: Once a week   Interpersonal Safety: Low Risk  (4/18/2025)     "Interpersonal Safety     Do you feel physically and emotionally safe where you currently live?: Yes     Within the past 12 months, have you been hit, slapped, kicked or otherwise physically hurt by someone?: No     Within the past 12 months, have you been humiliated or emotionally abused in other ways by your partner or ex-partner?: No   Housing Stability: Low Risk  (6/1/2025)    Housing Stability     Do you have housing? : Yes     Are you worried about losing your housing?: No       VITALS:  /84   Pulse 64   Temp 97.8  F (36.6  C) (Temporal)   Resp 28   Ht 1.575 m (5' 2\")   Wt 79.4 kg (175 lb)   SpO2 95%   BMI 32.01 kg/m      PHYSICAL EXAM      Vitals: /84   Pulse 64   Temp 97.8  F (36.6  C) (Temporal)   Resp 28   Ht 1.575 m (5' 2\")   Wt 79.4 kg (175 lb)   SpO2 95%   BMI 32.01 kg/m    General: Appears in no acute distress, awake, alert, interactive.  Eyes: Conjunctivae non-injected. Sclera anicteric.  HENT: Atraumatic.  Neck: Supple.  Respiratory/Chest: Respiration unlabored.  Abdomen: non distended. Well healed surgical incision. Mild epigastric abdominal tenderness.  Musculoskeletal: Normal extremities. No edema or erythema.  Skin: Normal color. No rash or diaphoresis.  Neurologic: Face symmetric, moves all extremities spontaneously. Speech clear.  Psychiatric: Oriented to person, place, and time. Affect appropriate.    LAB:  All pertinent labs reviewed and interpreted.  Results for orders placed or performed during the hospital encounter of 07/29/25   CTA Chest Abdomen Pelvis w Contrast    Impression    IMPRESSION:    1.  Long segmental mild to moderately distended small bowel with a relative transition point in the mid/lower abdomen. Small bowel immediately prior to and after the transition point is mildly thick-walled. This appearance may represent partial small   bowel obstruction, potentially due to adhesion.     Comprehensive Metabolic Panel (Limited Occurrences)   Result Value Ref " Range    Sodium 140 135 - 145 mmol/L    Potassium 3.9 3.4 - 5.3 mmol/L    Carbon Dioxide (CO2) 25 22 - 29 mmol/L    Anion Gap 12 7 - 15 mmol/L    Urea Nitrogen 13.0 6.0 - 20.0 mg/dL    Creatinine 1.09 0.67 - 1.17 mg/dL    GFR Estimate 90 >60 mL/min/1.73m2    Calcium 9.9 8.8 - 10.4 mg/dL    Chloride 103 98 - 107 mmol/L    Glucose 120 (H) 70 - 99 mg/dL    Alkaline Phosphatase 37 (L) 40 - 150 U/L    AST 30 0 - 45 U/L    ALT 43 0 - 70 U/L    Protein Total 7.3 6.4 - 8.3 g/dL    Albumin 4.9 3.5 - 5.2 g/dL    Bilirubin Total 1.0 <=1.2 mg/dL   Result Value Ref Range    Lipase 56 13 - 60 U/L   Result Value Ref Range    Troponin T, High Sensitivity 11 <=22 ng/L   CBC with platelets and differential   Result Value Ref Range    WBC Count 7.9 4.0 - 11.0 10e3/uL    RBC Count 5.34 4.40 - 5.90 10e6/uL    Hemoglobin 15.6 13.3 - 17.7 g/dL    Hematocrit 46.9 40.0 - 53.0 %    MCV 88 78 - 100 fL    MCH 29.2 26.5 - 33.0 pg    MCHC 33.3 31.5 - 36.5 g/dL    RDW 13.0 10.0 - 15.0 %    Platelet Count 175 150 - 450 10e3/uL    % Neutrophils 67 %    % Lymphocytes 21 %    % Monocytes 9 %    % Eosinophils 3 %    % Basophils 0 %    % Immature Granulocytes 0 %    NRBCs per 100 WBC 0 <1 /100    Absolute Neutrophils 5.3 1.6 - 8.3 10e3/uL    Absolute Lymphocytes 1.6 0.8 - 5.3 10e3/uL    Absolute Monocytes 0.7 0.0 - 1.3 10e3/uL    Absolute Eosinophils 0.2 0.0 - 0.7 10e3/uL    Absolute Basophils 0.0 0.0 - 0.2 10e3/uL    Absolute Immature Granulocytes 0.0 <=0.4 10e3/uL    Absolute NRBCs 0.0 10e3/uL   Result Value Ref Range    Troponin T, High Sensitivity 8 <=22 ng/L   Lactic Acid Whole Blood with 1X Repeat in 2 HR when >2   Result Value Ref Range    Lactic Acid, Initial 0.6 (L) 0.7 - 2.0 mmol/L       RADIOLOGY:  Reviewed all pertinent imaging. Please see official radiology report.  CTA Chest Abdomen Pelvis w Contrast   Final Result   IMPRESSION:      1.  Long segmental mild to moderately distended small bowel with a relative transition point in the  mid/lower abdomen. Small bowel immediately prior to and after the transition point is mildly thick-walled. This appearance may represent partial small    bowel obstruction, potentially due to adhesion.             EKG:    Performed at: 29-Jul-2025 00:56    Impression: sinus rhythm.    Rate: 69  Rhythm: sinus  Axis: 95  VT Interval: 184  QRS Interval: 104  QTc Interval: 398  ST Changes: No STEMI.  Comparison: When compared with ECG of 21-May-2025 19:37, no significant change was found.    I have independently reviewed and interpreted the EKG(s) documented above.        I, Carlos Chang, am serving as a scribe to document services personally performed by García Davis MD based on my observation and the provider's statements to me. I, García Davis MD, attest that Carlos Chang is acting in a scribe capacity, has observed my performance of the services and has documented them in accordance with my direction.    García Davis MD  [facility] EMERGENCY ROOM  [id number] [facility] [address] [id number] [phone]      García Davis MD  07/29/25 0352

## 2025-07-29 NOTE — CONSULTS
General Surgery Consultation  Anastacio Li MRN# [id number]   Age/Sex: 34 year old male YOB: 1990     Reason for consult: 1. SBO (small bowel obstruction) (H)            Requesting physician: Narciso Becker MD                    Assessment and Plan:   Assessment:  Anastacio Li is a 34 year old male who presents with abdominal pain and nausea that started yesterday evening. Afebrile and no tachycardia. Labs notable for no leukocytosis. CT scan shows a long segment of mild/moderately distended small bowel with relative transition point in mid/lower abdomen concerning for a partial SBO. Symptoms could also be due to gastroenteritis with mild small bowel thickening also noted on imaging. Reassuring patient feels much better this morning and is passing gas with no N/V. No planned surgical intervention at this time and would recommend clear liquid diet trial. If truly concerned for a SBO then would recommend a PO GGC.     Plan:  - No planned surgical intervention at this time  - Recommend clear liquid diet then ADAT if tolerating clears  - If truly concerned with a SBO then recommend PO GGC  - Medical management per primary team  - General surgery will sign off, please contact us with any questions or concerns          Chief Complaint:     Chief Complaint   Patient presents with    Abdominal Pain     LUQ        History is obtained from the patient and chart review.    HPI:   Anastacio Li is a 34 year old male who presents abdominal pain that started yesterday evening. States he developed LUQ pain around 11 PM yesterday. Was having some nausea prior to this before going to bed and took Prilosec. State the abdominal pain felt sharp and would come in waves. Pain does not not radiate anywhere. Symptoms persisted overnight which prompted him to get evaluated in the ED. States he had somewhat similar pain when he had his gallbladder removed last month. Reports intermittent constipation with last BM 2 days ago. Denies fever,  chills, emesis, changes to bladder function. No recent travel and no known sick contacts. States this morning he feels much better with no significant abdominal pain or distention. Is passing gas. Denies N/V and feels like he could tolerate a diet.    Per chart review, not on blood thinners and previous abdominal surgery history includes a laparoscopic cholecystectomy on 6/1/25.          Past Medical History:     Past Medical History:   Diagnosis Date    Acute cholecystitis 05/31/2025    Anxiety     GERD (gastroesophageal reflux disease) 05/31/2025    Insomnia 05/31/2025    Prediabetes 04/2025              Past Surgical History:     Past Surgical History:   Procedure Laterality Date    NO PAST SURGERIES               Social History:    reports that he has never smoked. He has never been exposed to tobacco smoke. He has never used smokeless tobacco. He reports current alcohol use. He reports current drug use. Drug: Marijuana.           Family History:     Family History   Problem Relation Age of Onset    Schizophrenia Mother     Hypertension Father     Gout Father     Cerebrovascular Disease Father 50 - 59        passed from CVA in 50's    Gout Brother     Gout Brother               Allergies:   No Known Allergies           Medications:     Prior to Admission medications   Medication Sig Start Date End Date Taking? Authorizing Provider   acetaminophen (TYLENOL) 500 MG tablet Take 500-1,000 mg by mouth every 6 hours as needed for mild pain.   Yes Unknown, Entered By History   calcium carbonate (TUMS) 500 MG chewable tablet Take 1 chew tab by mouth 2 times daily as needed for heartburn.   Yes Unknown, Entered By History   ibuprofen (ADVIL/MOTRIN) 200 MG tablet Take 200 mg by mouth every 6 hours as needed for pain.   Yes Unknown, Entered By History   traZODone (DESYREL) 50 MG tablet TAKE 0.5 TABLETS (25 MG) BY MOUTH AT BEDTIME.  Patient taking differently: Take 25 mg by mouth nightly as needed for sleep. 7/14/25  Yes  "Morning, [place] CURRIE, NIRAJ              Review of Systems:   The Review of Systems is negative other than noted in the HPI          Physical Exam:   Patient Vitals for the past 24 hrs:   BP Temp Temp src Pulse Resp SpO2 Height Weight   07/29/25 0750 130/86 98.6  F (37  C) Oral 75 23 100 % -- --   07/29/25 0144 126/84 -- -- 64 28 95 % -- --   07/29/25 0120 (!) 158/101 -- -- -- -- -- -- --   07/29/25 0015 (!) 182/110 97.8  F (36.6  C) Temporal 85 24 98 % 1.575 m (5' 2\") 79.4 kg (175 lb)      Intake/Output Summary (Last 24 hours) at 7/29/2025 0902  Last data filed at 7/29/2025 0657  Gross per 24 hour   Intake 251.67 ml   Output --   Net 251.67 ml      Constitutional:   Awake, alert, cooperative, no apparent distress, and appears stated age     Eyes:   PERRL, conjunctiva/corneas clear, EOM's intact; no scleral edema or icterus noted      ENT:   Normocephalic, without obvious abnormality, atraumatic, Lips, mucosa, and tongue normal      Lungs:   Normal respiratory effort, no accessory muscle use     Cardiovascular:   Regular rate and rhythm     Abdomen:   Soft, non-distended, non-tender. No rebound tenderness or guarding.     Musculoskeletal:   No obvious swelling, bruising or deformity     Skin:   Skin color and texture normal for patient, no rashes or lesions              Data:         All imaging studies reviewed by me.    Recent Results (from the past 24 hours)   CBC with Platelets and Differential (Limited Occurrences)    Narrative    The following orders were created for panel order CBC with Platelets and Differential (Limited Occurrences).  Procedure                               Abnormality         Status                     ---------                               -----------         ------                     CBC with platelets and ...[[id number]]                      Final result                 Please view results for these tests on the individual orders.   Comprehensive Metabolic Panel (Limited Occurrences) "   Result Value Ref Range    Sodium 140 135 - 145 mmol/L    Potassium 3.9 3.4 - 5.3 mmol/L    Carbon Dioxide (CO2) 25 22 - 29 mmol/L    Anion Gap 12 7 - 15 mmol/L    Urea Nitrogen 13.0 6.0 - 20.0 mg/dL    Creatinine 1.09 0.67 - 1.17 mg/dL    GFR Estimate 90 >60 mL/min/1.73m2    Calcium 9.9 8.8 - 10.4 mg/dL    Chloride 103 98 - 107 mmol/L    Glucose 120 (H) 70 - 99 mg/dL    Alkaline Phosphatase 37 (L) 40 - 150 U/L    AST 30 0 - 45 U/L    ALT 43 0 - 70 U/L    Protein Total 7.3 6.4 - 8.3 g/dL    Albumin 4.9 3.5 - 5.2 g/dL    Bilirubin Total 1.0 <=1.2 mg/dL   Lipase   Result Value Ref Range    Lipase 56 13 - 60 U/L   Troponin T, High Sensitivity   Result Value Ref Range    Troponin T, High Sensitivity 11 <=22 ng/L   CBC with platelets and differential   Result Value Ref Range    WBC Count 7.9 4.0 - 11.0 10e3/uL    RBC Count 5.34 4.40 - 5.90 10e6/uL    Hemoglobin 15.6 13.3 - 17.7 g/dL    Hematocrit 46.9 40.0 - 53.0 %    MCV 88 78 - 100 fL    MCH 29.2 26.5 - 33.0 pg    MCHC 33.3 31.5 - 36.5 g/dL    RDW 13.0 10.0 - 15.0 %    Platelet Count 175 150 - 450 10e3/uL    % Neutrophils 67 %    % Lymphocytes 21 %    % Monocytes 9 %    % Eosinophils 3 %    % Basophils 0 %    % Immature Granulocytes 0 %    NRBCs per 100 WBC 0 <1 /100    Absolute Neutrophils 5.3 1.6 - 8.3 10e3/uL    Absolute Lymphocytes 1.6 0.8 - 5.3 10e3/uL    Absolute Monocytes 0.7 0.0 - 1.3 10e3/uL    Absolute Eosinophils 0.2 0.0 - 0.7 10e3/uL    Absolute Basophils 0.0 0.0 - 0.2 10e3/uL    Absolute Immature Granulocytes 0.0 <=0.4 10e3/uL    Absolute NRBCs 0.0 10e3/uL   ECG 12-LEAD WITH MUSE (LHE)   Result Value Ref Range    Systolic Blood Pressure  mmHg    Diastolic Blood Pressure  mmHg    Ventricular Rate 69 BPM    Atrial Rate 69 BPM    MI Interval 184 ms    QRS Duration 104 ms     ms    QTc 398 ms    P Axis 77 degrees    R AXIS 95 degrees    T Axis 47 degrees    Interpretation ECG       Sinus rhythm  Rightward axis  Borderline ECG  When compared with ECG of  21-May-2025 19:37,  No significant change was found  Confirmed by SEE ED PROVIDER NOTE FOR, ECG INTERPRETATION ([id number]),  Cale Gonzalez ([id number]) on 7/29/2025 7:58:09 AM     CTA Chest Abdomen Pelvis w Contrast    Narrative    EXAM: CTA CHEST ABDOMEN PELVIS W CONTRAST  LOCATION: [facility]  DATE: 7/29/2025    INDICATION: Upper abdominal pain that radiates to the back. Recent cholecystectomy.  COMPARISON: CTA chest abdomen pelvis 5/31/2025.  TECHNIQUE: CT angiogram chest abdomen pelvis during arterial phase of injection of IV contrast. 2D and 3D MIP reconstructions were performed by the CT technologist. Dose reduction techniques were used.   CONTRAST: 100 ml isovue 370    FINDINGS:   CT ANGIOGRAM CHEST, ABDOMEN, AND PELVIS: No visible atherosclerosis. No aortic wall hematoma, aneurysm or dissection. 3 arch vessels, celiac artery, SMA, 2 right/single left renal arteries, JEANNETTE and bilateral iliofemoral arteries are fully patent.    LUNGS AND PLEURA: Normal.    MEDIASTINUM/AXILLAE: Normal.    CORONARY ARTERY CALCIFICATION: None.    HEPATOBILIARY: Normal liver. Gallbladder is surgically absent.    PANCREAS: Normal.    SPLEEN: Normal.    ADRENAL GLANDS: Normal.    KIDNEYS/BLADDER: Normal.    BOWEL: A long segment of mild to moderately distended small bowel is seen in the abdomen with a relative transition point in the mid/lower abdomen on series 5 image 153. Small bowel immediately before and after the transition point appears mildly   thickened. Remainder of the gastrointestinal tract including the appendix is normal. No free fluid or free air.    LYMPH NODES: Normal.    PELVIC ORGANS: Normal.    MUSCULOSKELETAL: Normal.        Impression    IMPRESSION:    1.  Long segmental mild to moderately distended small bowel with a relative transition point in the mid/lower abdomen. Small bowel immediately prior to and after the transition point is mildly thick-walled. This appearance may represent  partial small   bowel obstruction, potentially due to adhesion.     Troponin T, High Sensitivity   Result Value Ref Range    Troponin T, High Sensitivity 8 <=22 ng/L   Lactic Acid Whole Blood with 1X Repeat in 2 HR when >2   Result Value Ref Range    Lactic Acid, Initial 0.6 (L) 0.7 - 2.0 mmol/L           Inez Teixeira PA-C  [facility] General Surgery  [address] [phone]   [facility] [phone]

## 2025-07-29 NOTE — PROVIDER NOTIFICATION
Pt discharge to home via family. PIV removed. All belongings returned. AVS printed and all questions answered.

## 2025-07-29 NOTE — PLAN OF CARE
B/P: 125/83, T: 98.5, P: 68, HR: 16    Pt awake and alert    Diet: Regular Diet Adult  Diet  tolerated regular diet    IV Saline Lock; IVF discontinued    Alarms on for safety; call light within reach.    Mobility: up Independently    HLM Admission: 8- Walk 250 feet or more  HLM Daily: 7-Walk 25 feet or more         Discharge Plan home        Problem: Adult Inpatient Plan of Care  Goal: Plan of Care Review  Description: The Plan of Care Review/Shift note should be completed every shift.  The Outcome Evaluation is a brief statement about your assessment that the patient is improving, declining, or no change.  This information will be displayed automatically on your shift  note.  Outcome: Progressing   Goal Outcome Evaluation:

## 2025-07-29 NOTE — PROGRESS NOTES
"[facility] ED Handoff Report    ED Chief Complaint: Small Bowel Obstruction, Abdominal pain    ED Diagnosis:  (K56.609) SBO (small bowel obstruction) (H)  (primary encounter diagnosis)  Comment:   Plan: NPO, IV fluids, pain meds, General surgery consult       PMH:    Past Medical History:   Diagnosis Date    Acute cholecystitis 05/31/2025    Anxiety     GERD (gastroesophageal reflux disease) 05/31/2025    Insomnia 05/31/2025    Prediabetes 04/2025        Code Status:  Full Code     Falls Risk: No Band: Not applicable    Current Living Situation/Residence: lives in a house     Elimination Status: Continent: Yes     Activity Level: Independent    Patient's Preferred Language:  English     Needed: No    Vital Signs:  /84   Pulse 64   Temp 97.8  F (36.6  C) (Temporal)   Resp 28   Ht 1.575 m (5' 2\")   Wt 79.4 kg (175 lb)   SpO2 95%   BMI 32.01 kg/m       Cardiac Rhythm: Regular Rhythm    Pain Score: 2/10    Is the Patient Confused:  No    Last Food or Drink: 7/28/25     Assessment and Plan of Care:    \"presented to the ED with complaints of abdominal pain.  Patient reports that he had nausea last night and took Prilosec before going to bed.  Reports that he woke up around 11 PM with 5/10, left upper quadrant abdominal pain.  Reports the pain was sharp.  Reports the pain increased to about 8/10 within 1 hour so he decided come to the ED.  He reports that his last bowel movement was about 24 hours ago.  States that he has not passed flatus since since about noon yesterday.  He denies any vomiting.  He reports that his pain is now improved to 2/10.  Denies any fevers, chills, chest pain, diarrhea, constipation, dysuria, hematuria, shortness of breath, or any other new symptoms.       In the ED, CBC was unremarkable.  CMP was also unremarkable.  Lipase was 56.  Troponin was 11.  Repeat troponin was 8.  Lactic acid was 3.6.  CT abdomen pelvis showed long segment 12 mild to moderately distended " "small bowel with relative transition point in the mid/lower abdomen.  Small bowel immediately prior to and after the transition point is mildly thick-walled.  This appearance may represent partial small bowel obstruction, potentially due to adhesion. Vital signs were stable except for elevated blood pressures.  Blood pressure up to 182 and diastolic blood pressure of 110.  Respiratory rate 20s.  He was saturating well on room air.     # Small bowel obstruction:  N.p.o.  IV fluids  As needed pain regimen  Monitor replace electrolytes  General Surgery recommends holding off on NG tube for now  General Surgery consulted, follow-up on their recommendations\"      Tests Performed: Done: Labs and Imaging    Treatments Provided:  IV fluids, IV pain med    Family Dynamics/Concerns: No    Belongings Checklist Done and Signed by Patient: N/A    Belongings Sent with Patient: YES    Boarding medications sent with patient: n/a    Additional Information: Pt AxO on RA. Up independent. LR @100 ml/hr. Pt NPO.    RN: Dora Reyes, RN   7/29/2025 6:06 AM       "

## 2025-07-29 NOTE — PHARMACY-ADMISSION MEDICATION HISTORY
Pharmacist Admission Medication History    Admission medication history is complete. The information provided in this note is only as accurate as the sources available at the time of the update.    Information Source(s): Patient and CareEverywhere/SureScripts via in-person    Pertinent Information: n/a    Changes made to PTA medication list:  Added: ibuprofen  Deleted: None  Changed: trazodone to PRN    Allergies reviewed with patient and updates made in EHR: yes    Medication History Completed By: Joanna Cronin [facility] 7/29/2025 8:33 AM    PTA Med List   Medication Sig Last Dose/Taking    acetaminophen (TYLENOL) 500 MG tablet Take 500-1,000 mg by mouth every 6 hours as needed for mild pain. Past Month    calcium carbonate (TUMS) 500 MG chewable tablet Take 1 chew tab by mouth 2 times daily as needed for heartburn. Unknown    ibuprofen (ADVIL/MOTRIN) 200 MG tablet Take 200 mg by mouth every 6 hours as needed for pain. 7/28/2025    traZODone (DESYREL) 50 MG tablet TAKE 0.5 TABLETS (25 MG) BY MOUTH AT BEDTIME. (Patient taking differently: Take 25 mg by mouth nightly as needed for sleep.) Past Week

## 2025-07-29 NOTE — H&P
[facility]    History and Physical - Hospitalist Service       Date of Admission:  7/29/2025    Assessment & Plan      Anastacio Li is a 34 year old male admitted on 7/29/2025. He has medical history significant for GERD, prediabetes, insomnia, and acute cholecystitis status post cholecystectomy on June 1, 2025.  He presented to the ED with complaints of abdominal pain.  Patient reports that he had nausea last night and took Prilosec before going to bed.  Reports that he woke up around 11 PM with 5/10, left upper quadrant abdominal pain.  Reports the pain was sharp.  Reports the pain increased to about 8/10 within 1 hour so he decided come to the ED.  He reports that his last bowel movement was about 24 hours ago.  States that he has not passed flatus since since about noon yesterday.  He denies any vomiting.  He reports that his pain is now improved to 2/10.  Denies any fevers, chills, chest pain, diarrhea, constipation, dysuria, hematuria, shortness of breath, or any other new symptoms.      In the ED, CBC was unremarkable.  CMP was also unremarkable.  Lipase was 56.  Troponin was 11.  Repeat troponin was 8.  Lactic acid was 3.6.  CT abdomen pelvis showed long segment 12 mild to moderately distended small bowel with relative transition point in the mid/lower abdomen.  Small bowel immediately prior to and after the transition point is mildly thick-walled.  This appearance may represent partial small bowel obstruction, potentially due to adhesion. Vital signs were stable except for elevated blood pressures.  Blood pressure up to 182 and diastolic blood pressure of 110.  Respiratory rate 20s.  He was saturating well on room air.    # Small bowel obstruction:  N.p.o.  IV fluids  As needed pain regimen  Monitor replace electrolytes  General Surgery recommends holding off on NG tube for now  General Surgery consulted, follow-up on their recommendations    # Insomnia  Continue trazodone    #  "GERD:  Continue Tums as needed            Diet: NPO for Medical/Clinical Reasons Except for: Meds, Ice Chips    DVT Prophylaxis: Low Risk/Ambulatory with no VTE prophylaxis indicated  Hager Catheter: Not present  Lines: None     Cardiac Monitoring: None  Code Status: Full Code      Clinically Significant Risk Factors Present on Admission                             # Obesity: Estimated body mass index is 32.01 kg/m  as calculated from the following:    Height as of this encounter: 1.575 m (5' 2\").    Weight as of this encounter: 79.4 kg (175 lb).              Disposition Plan     Medically Ready for Discharge: Anticipated in 2-4 Days           Narciso Becker MD  Hospitalist Service  [facility]  Securely message with [employer] (more info)  Text page via [facility] Paging/Directory     ______________________________________________________________________    Chief Complaint   Chief Complaint   Patient presents with    Abdominal Pain     LUQ         History is obtained from the patient    History of Present Illness   Anastacio Li is a 34 year old male who has medical history significant for GERD, prediabetes, insomnia, and acute cholecystitis status post cholecystectomy on June 1, 2025.  He presented to the ED with complaints of abdominal pain.  Patient reports that he had nausea last night and took Prilosec before going to bed.  Reports that he woke up around 11 PM with 5/10, left upper quadrant abdominal pain.  Reports the pain was sharp.  Reports the pain increased to about 8/10 within 1 hour so he decided come to the ED.  He reports that his last bowel movement was about 24 hours ago.  States that he has not passed flatus since since about noon yesterday.  He denies any vomiting.  He reports that his pain is now improved to 2/10.  Denies any fevers, chills, chest pain, diarrhea, constipation, dysuria, hematuria, shortness of breath, or any other new symptoms.        Past Medical History    Past " Medical History:   Diagnosis Date    Acute cholecystitis 05/31/2025    Anxiety     GERD (gastroesophageal reflux disease) 05/31/2025    Insomnia 05/31/2025    Prediabetes 04/2025       Past Surgical History   Past Surgical History:   Procedure Laterality Date    NO PAST SURGERIES         Prior to Admission Medications   Prior to Admission Medications   Prescriptions Last Dose Informant Patient Reported? Taking?   acetaminophen (TYLENOL) 500 MG tablet   Yes No   Sig: Take 500-1,000 mg by mouth every 6 hours as needed for mild pain.   calcium carbonate (TUMS) 500 MG chewable tablet   Yes No   Sig: Take 1 chew tab by mouth 2 times daily as needed for heartburn.   traZODone (DESYREL) 50 MG tablet   No No   Sig: TAKE 0.5 TABLETS (25 MG) BY MOUTH AT BEDTIME.      Facility-Administered Medications: None        Review of Systems    The 10 point Review of Systems is negative other than noted in the HPI or here.      Physical Exam   Vital Signs: Temp: 97.8  F (36.6  C) Temp src: Temporal BP: 126/84 Pulse: 64   Resp: 28 SpO2: 95 % O2 Device: None (Room air)    Weight: 175 lbs 0 oz    General: AAOx3, NAD, pleasant.  HEENT: NCAT, pupils are equal and round, anicteric, oral mucosa is moist.  Neck: Supple,  Cardiac: RRR.  No murmur. No rub or gallop.  Respiratory: CTAB, no crackles, wheezes, rales, or rhonchi  Abdomen: Absent bowel sounds.  Mild diffuse tenderness palpation.  Soft.  Nondistended.  Extremity: No LE edema, DP pulses palpable.   Neuro: AAO x3,   Psych: Calm and cooperative.       Medical Decision Making       >50 MINUTES SPENT BY ME on the date of service doing chart review, history, exam, documentation & further activities per the note.      Data     I have personally reviewed the following data over the past 24 hrs:    7.9  \   15.6   / 175     140 103 13.0 /  120 (H)   3.9 25 1.09 \     ALT: 43 AST: 30 AP: 37 (L) TBILI: 1.0   ALB: 4.9 TOT PROTEIN: 7.3 LIPASE: 56     Trop: 8 BNP: N/A     Procal: N/A CRP: N/A Lactic  Acid: 0.6 (L)         Imaging results reviewed over the past 24 hrs:   Recent Results (from the past 24 hours)   CTA Chest Abdomen Pelvis w Contrast    Narrative    EXAM: CTA CHEST ABDOMEN PELVIS W CONTRAST  LOCATION: [facility]  DATE: 7/29/2025    INDICATION: Upper abdominal pain that radiates to the back. Recent cholecystectomy.  COMPARISON: CTA chest abdomen pelvis 5/31/2025.  TECHNIQUE: CT angiogram chest abdomen pelvis during arterial phase of injection of IV contrast. 2D and 3D MIP reconstructions were performed by the CT technologist. Dose reduction techniques were used.   CONTRAST: 100 ml isovue 370    FINDINGS:   CT ANGIOGRAM CHEST, ABDOMEN, AND PELVIS: No visible atherosclerosis. No aortic wall hematoma, aneurysm or dissection. 3 arch vessels, celiac artery, SMA, 2 right/single left renal arteries, JEANNETTE and bilateral iliofemoral arteries are fully patent.    LUNGS AND PLEURA: Normal.    MEDIASTINUM/AXILLAE: Normal.    CORONARY ARTERY CALCIFICATION: None.    HEPATOBILIARY: Normal liver. Gallbladder is surgically absent.    PANCREAS: Normal.    SPLEEN: Normal.    ADRENAL GLANDS: Normal.    KIDNEYS/BLADDER: Normal.    BOWEL: A long segment of mild to moderately distended small bowel is seen in the abdomen with a relative transition point in the mid/lower abdomen on series 5 image 153. Small bowel immediately before and after the transition point appears mildly   thickened. Remainder of the gastrointestinal tract including the appendix is normal. No free fluid or free air.    LYMPH NODES: Normal.    PELVIC ORGANS: Normal.    MUSCULOSKELETAL: Normal.        Impression    IMPRESSION:    1.  Long segmental mild to moderately distended small bowel with a relative transition point in the mid/lower abdomen. Small bowel immediately prior to and after the transition point is mildly thick-walled. This appearance may represent partial small   bowel obstruction, potentially due to adhesion.

## 2025-07-29 NOTE — PROGRESS NOTES
[facility]    Medicine Progress Note - Hospitalist Service    Date of Admission:  7/29/2025    Assessment & Plan   Anastacio Li is a 34 year old male admitted on 7/29/2025. He has medical history significant for GERD, prediabetes, insomnia, and acute cholecystitis status post cholecystectomy on June 1, 2025.  He presented to the ED with complaints of abdominal pain.  Patient reported that he had nausea on 7/28 PM, and took Prilosec before going to bed.  Woke up around 11 PM with 5/10 sharp, LUQ abdominal pain.  Pain increased to about 8/10 within 1 hour so presented to the ED.  Last BM was 24 hours prior. Stated originally that he had not passed flatus since prior day.  Denied vomiting.      In the ED, CBC was unremarkable.  CMP was also unremarkable.  Lipase was 56.  Troponin was 11.  Repeat troponin was 8.  Lactic acid was 0.6.  CT abdomen pelvis on 7/29 showed: long segment 12 mild to moderately distended small bowel with relative transition point in the mid/lower abdomen.  Small bowel immediately prior to and after the transition point is mildly thick-walled.  This appearance may represent partial small bowel obstruction, potentially due to adhesion.      Small bowel obstruction  - IV fluids  - Hydromorphone / Tylenol prn for pain  - Zofran prn for nausea  - Monitor replace electrolytes  - General Surgery following - will follow their recommendations: ADAT, no surgical intervention at this time, consider gastrografin challenge if fails diet advancement  - Plan to discharge if patient is able to advance diet and pain is controlled     Insomnia  - Continue trazodone prn     GERD  - Continue TUMS prn          Diet: NPO for Medical/Clinical Reasons Except for: Meds, Ice Chips    DVT Prophylaxis: Pneumatic Compression Devices  Hager Catheter: Not present  Lines: None     Cardiac Monitoring: None  Code Status: Full Code      Clinically Significant Risk Factors Present on Admission   { TIP  This  "section helps capture the illness of the patient on admission.     - Review diagnoses highlighted in blue; right click, edit & delete if not appropriate   - If blank, no additional diagnoses identified   :81058}                          # Obesity: Estimated body mass index is 32.01 kg/m  as calculated from the following:    Height as of this encounter: 1.575 m (5' 2\").    Weight as of this encounter: 79.4 kg (175 lb).              Social Drivers of Health    Physical Activity: Unknown (4/18/2025)    Exercise Vital Sign     Days of Exercise per Week: 3 days   Stress: Stress Concern Present (4/18/2025)    [employer] - Occupational Stress Questionnaire     Feeling of Stress : Rather much   Social Connections: Unknown (4/18/2025)    Social Connection and Isolation Panel [NHANES]     Frequency of Social Gatherings with Friends and Family: Once a week          Disposition Plan   {TIP  The patient's Medical Readiness for Discharge [MRD] has been documented today or is 'Ready Now'. Last Documentation- Anticipated in 2-4 Days   Use SmartList below if a change is needed.  :27300}  Medically Ready for Discharge: {TIME; MEDICALLY READY FOR DISCHARGE:82461768}           The patient's care was discussed with the Attending Physician, Dr. Jaylen MD and Patient.    [facility]  Hospitalist Service  [facility]  Securely message with [employer] (more info)  Text page via [employer] Paging/Directory   ______________________________________________________________________    Interval History   Patient reports pain has decreased down to 1/10 following hydromorphone x2; pain remains primarily in the LUQ and is described as sharp or stabbing. Reports only radiation of pain is occasional back spasms. No longer nauseous; last Zofran was at 0047. No bowel movements thus far today, but able to pass gas. No pain or blood with urination. No fever.    Physical Exam   Vital Signs: Temp: " "98.6  F (37  C) Temp src: Oral BP: 130/86 Pulse: 55   Resp: 20 SpO2: 99 % O2 Device: None (Room air)    Weight: 175 lbs 0 oz    General Appearance: Patient presents lying in bed, does not appear to be in any acute distress  Respiratory: No increased respiratory work, regular rate, clear lung sounds present in all lung fields  Cardiovascular: regular rate and rhythm, S1 and S2 present without any murmurs or gallops  GI: multiple healed scars from 6/1 cholecystectomy, non-distended, no bruising, bowel sounds present in all four quadrants, mildly tender to LUQ, otherwise non-tender    Medical Decision Making   { TIP   MDM Calculator    MDM grid (w/ times)    Coding Support Chat  Billing is now based on time OR medical decision making complexity. Medical decision making included in your A&P does NOT need to be re-documented here.    :95570}    {Time  :009300::\"*** MINUTES SPENT BY ME on the date of service doing chart review, history, exam, documentation & further activities per the note.\"}      Data     I have personally reviewed the following data over the past 24 hrs:    7.9  \   15.6   / 175     140 103 13.0 /  120 (H)   3.9 25 1.09 \     ALT: 43 AST: 30 AP: 37 (L) TBILI: 1.0   ALB: 4.9 TOT PROTEIN: 7.3 LIPASE: 56     Trop: 8 BNP: N/A     Procal: N/A CRP: N/A Lactic Acid: 0.6 (L)         Imaging results reviewed over the past 24 hrs:   Recent Results (from the past 24 hours)   CTA Chest Abdomen Pelvis w Contrast    Narrative    EXAM: CTA CHEST ABDOMEN PELVIS W CONTRAST  LOCATION: [facility]  DATE: 7/29/2025    INDICATION: Upper abdominal pain that radiates to the back. Recent cholecystectomy.  COMPARISON: CTA chest abdomen pelvis 5/31/2025.  TECHNIQUE: CT angiogram chest abdomen pelvis during arterial phase of injection of IV contrast. 2D and 3D MIP reconstructions were performed by the CT technologist. Dose reduction techniques were used.   CONTRAST: 100 ml isovue 370    FINDINGS:   CT " ANGIOGRAM CHEST, ABDOMEN, AND PELVIS: No visible atherosclerosis. No aortic wall hematoma, aneurysm or dissection. 3 arch vessels, celiac artery, SMA, 2 right/single left renal arteries, JEANNETTE and bilateral iliofemoral arteries are fully patent.    LUNGS AND PLEURA: Normal.    MEDIASTINUM/AXILLAE: Normal.    CORONARY ARTERY CALCIFICATION: None.    HEPATOBILIARY: Normal liver. Gallbladder is surgically absent.    PANCREAS: Normal.    SPLEEN: Normal.    ADRENAL GLANDS: Normal.    KIDNEYS/BLADDER: Normal.    BOWEL: A long segment of mild to moderately distended small bowel is seen in the abdomen with a relative transition point in the mid/lower abdomen on series 5 image 153. Small bowel immediately before and after the transition point appears mildly   thickened. Remainder of the gastrointestinal tract including the appendix is normal. No free fluid or free air.    LYMPH NODES: Normal.    PELVIC ORGANS: Normal.    MUSCULOSKELETAL: Normal.        Impression    IMPRESSION:    1.  Long segmental mild to moderately distended small bowel with a relative transition point in the mid/lower abdomen. Small bowel immediately prior to and after the transition point is mildly thick-walled. This appearance may represent partial small   bowel obstruction, potentially due to adhesion.

## 2025-07-30 NOTE — DISCHARGE SUMMARY
"[facility]  Hospitalist Discharge Summary      Date of Admission:  7/29/2025  Date of Discharge:  7/29/2025  5:38 PM  Discharging Provider: Jacky York MD  Discharge Service: Hospitalist Service    Discharge Diagnoses   SBO    Clinically Significant Risk Factors     # Obesity: Estimated body mass index is 32.01 kg/m  as calculated from the following:    Height as of this encounter: 1.575 m (5' 2\").    Weight as of this encounter: 79.4 kg (175 lb).       Follow-ups Needed After Discharge   Follow-up Appointments       Hospital Follow-up with Existing Primary Care Provider (PCP)          Schedule Primary Care visit within: 7 Days           {Additional follow-up instructions/to-do's for PCP       Unresulted Labs Ordered in the Past 30 Days of this Admission       No orders found for last 31 day(s).        These results will be followed up by     Discharge Disposition   Discharged to home  Condition at discharge: Stable    Hospital Course   34-year-old male past med history significant for cholecystectomy during first of 2025, presented with abdominal pain and nausea and subsequently found to have imaging concerning for small bowel obstruction.  General surgery was consulted on morning of discharge patient was actually feeling much better was passing gas had not had a bowel movement but no longer has any pain General Surgery did not feel as though this represented an SBO his diet was advanced tolerated this well was discharged given no recurrence of symptoms.    Consultations This Hospital Stay   SURGERY GENERAL IP CONSULT    Code Status   Prior    Time Spent on this Encounter   I, Jacky York MD, personally saw the patient today and spent greater than 30 minutes discharging this patient.       Jacky York MD  [address] [address]  Phone: [phone]  Fax: " [phone]  ______________________________________________________________________    Physical Exam   Vital Signs: Temp: 98.5  F (36.9  C) Temp src: Oral BP: 125/83 Pulse: 68   Resp: 16 SpO2: 96 % O2 Device: None (Room air)    Weight: 175 lbs 0 oz  Gen: Appears well, NAD, on RA  Card:Warm well perfused, pulses assumed patient talking  Pulm: Normal I/E effort on RA  Abd:Non distended  Skin:No obvious rashes or lesions on exposed areas of skin  Neuro AxOx4, S/S grossly intact, no obvious FND,   Psych:Pleasant, answering questions appropriately, insight good, judgement good, does not appear to be responding to I/E stimuli        Primary Care Physician   Madison CURRIE Morning    Discharge Orders      Reason for your hospital stay    Concern for SBO     Activity    Your activity upon discharge: activity as tolerated     Discharge Instructions    Return to care if symptoms return nausea/vomiting.     Diet    Follow this diet upon discharge: Current Diet:Orders Placed This Encounter      Regular Diet Adult     Hospital Follow-up with Existing Primary Care Provider (PCP)            Significant Results and Procedures   Results for orders placed or performed during the hospital encounter of 07/29/25   CTA Chest Abdomen Pelvis w Contrast    Narrative    EXAM: CTA CHEST ABDOMEN PELVIS W CONTRAST  LOCATION: [facility]  DATE: 7/29/2025    INDICATION: Upper abdominal pain that radiates to the back. Recent cholecystectomy.  COMPARISON: CTA chest abdomen pelvis 5/31/2025.  TECHNIQUE: CT angiogram chest abdomen pelvis during arterial phase of injection of IV contrast. 2D and 3D MIP reconstructions were performed by the CT technologist. Dose reduction techniques were used.   CONTRAST: 100 ml isovue 370    FINDINGS:   CT ANGIOGRAM CHEST, ABDOMEN, AND PELVIS: No visible atherosclerosis. No aortic wall hematoma, aneurysm or dissection. 3 arch vessels, celiac artery, SMA, 2 right/single left renal arteries, JEANNETTE and  bilateral iliofemoral arteries are fully patent.    LUNGS AND PLEURA: Normal.    MEDIASTINUM/AXILLAE: Normal.    CORONARY ARTERY CALCIFICATION: None.    HEPATOBILIARY: Normal liver. Gallbladder is surgically absent.    PANCREAS: Normal.    SPLEEN: Normal.    ADRENAL GLANDS: Normal.    KIDNEYS/BLADDER: Normal.    BOWEL: A long segment of mild to moderately distended small bowel is seen in the abdomen with a relative transition point in the mid/lower abdomen on series 5 image 153. Small bowel immediately before and after the transition point appears mildly   thickened. Remainder of the gastrointestinal tract including the appendix is normal. No free fluid or free air.    LYMPH NODES: Normal.    PELVIC ORGANS: Normal.    MUSCULOSKELETAL: Normal.        Impression    IMPRESSION:    1.  Long segmental mild to moderately distended small bowel with a relative transition point in the mid/lower abdomen. Small bowel immediately prior to and after the transition point is mildly thick-walled. This appearance may represent partial small   bowel obstruction, potentially due to adhesion.         Discharge Medications      Review of your medicines        CONTINUE these medicines which have NOT CHANGED        Dose / Directions   acetaminophen 500 MG tablet  Commonly known as: TYLENOL      Dose: 500-1,000 mg  Take 500-1,000 mg by mouth every 6 hours as needed for mild pain.  Refills: 0     calcium carbonate 500 MG chewable tablet  Commonly known as: TUMS      Dose: 1 chew tab  Take 1 chew tab by mouth 2 times daily as needed for heartburn.  Refills: 0     ibuprofen 200 MG tablet  Commonly known as: ADVIL/MOTRIN      Dose: 200 mg  Take 200 mg by mouth every 6 hours as needed for pain.  Refills: 0     traZODone 50 MG tablet  Commonly known as: DESYREL  Used for: Primary insomnia      Dose: 25 mg  TAKE 0.5 TABLETS (25 MG) BY MOUTH AT BEDTIME.  Quantity: 45 tablet  Refills: 1            Allergies   No Known Allergies

## 2025-07-31 ENCOUNTER — PATIENT OUTREACH (OUTPATIENT)
Dept: CARE COORDINATION | Facility: CLINIC | Age: 35
End: 2025-07-31
Payer: COMMERCIAL

## 2025-07-31 NOTE — PROGRESS NOTES
Connected Care Resource Center Contact  [facility]/Voicemail     Clinical Data: Post-Discharge Outreach     Outreach attempted x 2.  Left message on patient's voicemail, providing [facility]'s central phone number of [phone] ([phone]) for questions/concerns and/or to schedule an appt with an [facility] provider, if they do not have a PCP.      Plan:  [facility] will do no further outreaches at this time.       SCARLET Rowley  Conne[facility] Resource Center, [facility]    *Connected Care Resource Team does NOT follow patient ongoing. Referrals are identified based on internal discharge reports and the outreach is to ensure patient has an understanding of their discharge instructions.       Patient was informed of an incidentally found 1.7 cm RUL nodule. He was instructed to follow-up with his PMD for f/u. Patient expressed understanding and a copy of the CT scan was provided. Patient is a 60y year old male with PMHx of alcohol abuse (drinks 1p vodka/day), pancreatic CA s/p whipple procedure in 2015, depression and HTN who presents after a ground-level fall, he reports he hit his head on a bedside table and had + HS and + LOC.     Primary Survey  A - airway intact  B - bilateral breath sounds and good chest rise  C - initially BP: 135/78 (11-29-23 @ 07:15), palpable pulses in all extremities  D - GCS 15 on arrival  Exposure obtained    Secondary survey  Gen: NAD  HEENT: L periorbital ecchymosis/edema, R neck soft tissue edema  CV: s1, s2, RRR  Pulm: CTA B/L  Chest: No TTP  Abd: Soft, ND, NT, no rebound, no guarding  Groin: Normal appearing  Ext: Palp radial b/l, palp DP b/l  Back: no TTP, no palpable runoff, stepoff, or deformity    CT head and C-spine (11/28) showed an acute left parafalcine SDH without mass effect. CT maxillofacial (11/28) showed extensive left periorbital and facial soft tissue swelling/hematoma, but no acute maxillofacial bone or mandibular fracture; soft tissue swelling of Right sternocleidomastoid muscle.     Patient was admitted under Acute Care Surgery/Trauma Surgery for further evaluation and management. K-Centra was given for elevated INR. Patient was treated with multimodal pain control and incentive spirometry was encouraged. SICU was consulted for closer hemodynamic monitoring and alcohol withdrawal, and accepted the patient.   Repeat 6 hour CTH (11/29) showed stable SDH with no significant mass effect. Repeat CT maxillofacial and C-spine (11/29) were all stable with no acute change changes. CTA head/neck (11/29) were unremarkable    Patient was downgraded to surgical floor on     Neurosurgery was consulted for the SDH. Neurosurgery recommended no acute neurosx intervention, and Keppra 500BID x 7 days for seizure ppx.     Incidental findings were documented on 11/29.     Physical therapy evaluated the patient, and recommended JOSH     On the day of discharge, patient's vital signs are within normal limits, pain is controlled, passing gas/stool, voiding well, tolerating a PO diet, and ambulating well. Patient will f/u with neurosurgery (Dr. Joyce) in 1-2 weeks after discharge from the hospital. Patient will f/u with their PCP in 1-2 weeks after discharge from the hospital Patient is a 60y year old male with PMHx of alcohol abuse (drinks 1p vodka/day), pancreatic CA s/p whipple procedure in 2015, depression and HTN who presents after a ground-level fall, he reports he hit his head on a bedside table and had + HS and + LOC.     Primary Survey  A - airway intact  B - bilateral breath sounds and good chest rise  C - initially BP: 135/78 (11-29-23 @ 07:15), palpable pulses in all extremities  D - GCS 15 on arrival  Exposure obtained    Secondary survey  Gen: NAD  HEENT: L periorbital ecchymosis/edema, R neck soft tissue edema  CV: s1, s2, RRR  Pulm: CTA B/L  Chest: No TTP  Abd: Soft, ND, NT, no rebound, no guarding  Groin: Normal appearing  Ext: Palp radial b/l, palp DP b/l  Back: no TTP, no palpable runoff, stepoff, or deformity    CT head and C-spine (11/28) showed an acute left parafalcine SDH without mass effect. CT maxillofacial (11/28) showed extensive left periorbital and facial soft tissue swelling/hematoma, but no acute maxillofacial bone or mandibular fracture; soft tissue swelling of Right sternocleidomastoid muscle.     Patient was admitted under Acute Care Surgery/Trauma Surgery for further evaluation and management. K-Centra was given for elevated INR. Patient was treated with multimodal pain control and incentive spirometry was encouraged. SICU was consulted for closer hemodynamic monitoring and alcohol withdrawal, and accepted the patient.   Repeat 6 hour CTH (11/29) showed stable SDH with no significant mass effect. Repeat CT maxillofacial and C-spine (11/29) were all stable with no acute change changes. CTA head/neck (11/29) were unremarkable. CT chest/abdomen/pelvis (11/29) showed symmetric enlargement and surrounding inflammation involving the right sternocleidomastoid muscle as well as infiltration of the subcutaneous fat overlying the right upper chest wall, likely representing the sequelae of recent trauma; nonspecific 1.7 cm nodular airspace opacity within the right upper lobe; no CT evidence of acute traumatic sequelae within the abdomen or pelvis; no acute thoracic spine fracture or evidence of traumatic malalignment.    Incidental findings were documented on 11/29. Tertiary exam was completed on 11/30    Patient was downgraded to surgical floor on     Neurosurgery was consulted for the SDH. Neurosurgery recommended no acute neurosx intervention, and Keppra 500BID x 7 days for seizure ppx.     Ophthalmology was consulted for large left periorbital hematoma. Opthalmology recommended ice to left eye for the first 48 hours, HOB elevation, erythromycin ointment, left eye TID due to subconjunctival hemorrhage and irregular corneal wetting    Physical/occupational therapy evaluated the patient, and recommended JOSH     On the day of discharge, patient's vital signs are within normal limits, pain is controlled, passing gas/stool, voiding well, tolerating a PO diet, and ambulating well. Patient will f/u with neurosurgery (Dr. Joyce) and your ophthalmologist vs. [facility] Department of Opthalmology in 1-2 weeks after discharge from the hospital. Patient will f/u with their PCP in 1-2 weeks after discharge from the hospital Patient is a 60y year old male with PMHx of alcohol abuse (drinks 1p vodka/day), pancreatic CA s/p whipple procedure in 2015, depression and HTN who presents after a ground-level fall, he reports he hit his head on a bedside table and had + HS and + LOC.     Primary Survey  A - airway intact  B - bilateral breath sounds and good chest rise  C - initially BP: 135/78 (11-29-23 @ 07:15), palpable pulses in all extremities  D - GCS 15 on arrival  Exposure obtained    Secondary survey  Gen: NAD  HEENT: L periorbital ecchymosis/edema, R neck soft tissue edema  CV: s1, s2, RRR  Pulm: CTA B/L  Chest: No TTP  Abd: Soft, ND, NT, no rebound, no guarding  Groin: Normal appearing  Ext: Palp radial b/l, palp DP b/l  Back: no TTP, no palpable runoff, stepoff, or deformity    CT head and C-spine (11/28) showed an acute left parafalcine SDH without mass effect. CT maxillofacial (11/28) showed extensive left periorbital and facial soft tissue swelling/hematoma, but no acute maxillofacial bone or mandibular fracture; soft tissue swelling of Right sternocleidomastoid muscle.     Patient was admitted under Acute Care Surgery/Trauma Surgery for further evaluation and management. K-Centra was given for elevated INR. Patient was treated with multimodal pain control and incentive spirometry was encouraged. SICU was consulted for closer hemodynamic monitoring and alcohol withdrawal, and accepted the patient.   Repeat 6 hour CTH (11/29) showed stable SDH with no significant mass effect. Repeat CT maxillofacial and C-spine (11/29) were all stable with no acute change changes. CTA head/neck (11/29) were unremarkable. CT chest/abdomen/pelvis (11/29) showed symmetric enlargement and surrounding inflammation involving the right sternocleidomastoid muscle as well as infiltration of the subcutaneous fat overlying the right upper chest wall, likely representing the sequelae of recent trauma; nonspecific 1.7 cm nodular airspace opacity within the right upper lobe; no CT evidence of acute traumatic sequelae within the abdomen or pelvis; no acute thoracic spine fracture or evidence of traumatic malalignment.    Incidental findings were documented on 11/29. Tertiary exam was completed on 11/30    Patient was downgraded to surgical floor on     Neurosurgery was consulted for the SDH. Neurosurgery recommended no acute neurosx intervention, and Keppra 500BID x 7 days for seizure ppx.     Ophthalmology was consulted for large left periorbital hematoma. Opthalmology recommended ice to left eye for the first 48 hours, HOB elevation, erythromycin ointment, left eye TID due to subconjunctival hemorrhage and irregular corneal wetting    Physical/occupational therapy evaluated the patient, and recommended JOSH     On the day of discharge, patient's vital signs are within normal limits, pain is controlled, passing gas/stool, voiding well, tolerating a PO diet, and ambulating well. Patient will f/u with neurosurgery (Dr. Joyce) and your ophthalmologist vs. [facility] Department of Opthalmology in 1-2 weeks after discharge from the hospital. Patient will f/u with their PCP in 1-2 weeks after discharge from the hospital Patient is a 60y year old male with PMHx of alcohol abuse (drinks 1p vodka/day), pancreatic CA s/p whipple procedure in 2015, depression and HTN who presents after a ground-level fall, he reports he hit his head on a bedside table and had + HS and + LOC.     Primary Survey  A - airway intact  B - bilateral breath sounds and good chest rise  C - initially BP: 135/78 (11-29-23 @ 07:15), palpable pulses in all extremities  D - GCS 15 on arrival  Exposure obtained    Secondary survey  Gen: NAD  HEENT: L periorbital ecchymosis/edema, R neck soft tissue edema  CV: s1, s2, RRR  Pulm: CTA B/L  Chest: No TTP  Abd: Soft, ND, NT, no rebound, no guarding  Groin: Normal appearing  Ext: Palp radial b/l, palp DP b/l  Back: no TTP, no palpable runoff, stepoff, or deformity    CT head and C-spine (11/28) showed an acute left parafalcine SDH without mass effect. CT maxillofacial (11/28) showed extensive left periorbital and facial soft tissue swelling/hematoma, but no acute maxillofacial bone or mandibular fracture; soft tissue swelling of Right sternocleidomastoid muscle.     Patient was admitted under Acute Care Surgery/Trauma Surgery for further evaluation and management. K-Centra was given for elevated INR. Patient was treated with multimodal pain control and incentive spirometry was encouraged. SICU was consulted for closer hemodynamic monitoring and alcohol withdrawal, and accepted the patient.   Repeat 6 hour CTH (11/29) showed stable SDH with no significant mass effect. Repeat CT maxillofacial and C-spine (11/29) were all stable with no acute change changes. CTA head/neck (11/29) were unremarkable. CT chest/abdomen/pelvis (11/29) showed symmetric enlargement and surrounding inflammation involving the right sternocleidomastoid muscle as well as infiltration of the subcutaneous fat overlying the right upper chest wall, likely representing the sequelae of recent trauma; nonspecific 1.7 cm nodular airspace opacity within the right upper lobe; no CT evidence of acute traumatic sequelae within the abdomen or pelvis; no acute thoracic spine fracture or evidence of traumatic malalignment.    Incidental findings were documented on 11/29. Tertiary exam was completed on 11/30    Patient was downgraded to surgical floor on     Neurosurgery was consulted for the SDH. Neurosurgery recommended no acute neurosx intervention, and Keppra 500BID x 7 days for seizure ppx.     Ophthalmology was consulted for large left periorbital hematoma. Opthalmology recommended ice to left eye for the first 48 hours, HOB elevation, erythromycin ointment, left eye TID due to subconjunctival hemorrhage and irregular corneal wetting    Physical/occupational therapy evaluated the patient, and recommended JOSH     On the day of discharge, patient's vital signs are within normal limits, pain is controlled, passing gas/stool, voiding well, tolerating a PO diet, and ambulating well. Patient will f/u with neurosurgery (Dr. Joyce) and your ophthalmologist vs. [facility] Department of Opthalmology in 1-2 weeks after discharge from the hospital. Patient will f/u with their PCP in 1-2 weeks after discharge from the hospital Patient is a 60y year old male with PMHx of alcohol abuse (drinks 1p vodka/day), pancreatic CA s/p whipple procedure in 2015, depression and HTN who presents after a ground-level fall, he reports he hit his head on a bedside table and had + HS and + LOC.     Primary Survey  A - airway intact  B - bilateral breath sounds and good chest rise  C - initially BP: 135/78 (11-29-23 @ 07:15), palpable pulses in all extremities  D - GCS 15 on arrival  Exposure obtained    Secondary survey  Gen: NAD  HEENT: L periorbital ecchymosis/edema, R neck soft tissue edema  CV: s1, s2, RRR  Pulm: CTA B/L  Chest: No TTP  Abd: Soft, ND, NT, no rebound, no guarding  Groin: Normal appearing  Ext: Palp radial b/l, palp DP b/l  Back: no TTP, no palpable runoff, stepoff, or deformity    CT head and C-spine (11/28) showed an acute left parafalcine SDH without mass effect. CT maxillofacial (11/28) showed extensive left periorbital and facial soft tissue swelling/hematoma, but no acute maxillofacial bone or mandibular fracture; soft tissue swelling of Right sternocleidomastoid muscle.     Patient was admitted under Acute Care Surgery/Trauma Surgery for further evaluation and management. K-Centra was given for elevated INR. Patient was treated with multimodal pain control and incentive spirometry was encouraged. SICU was consulted for closer hemodynamic monitoring and alcohol withdrawal, and accepted the patient.   Repeat 6 hour CTH (11/29) showed stable left SDH with no significant mass effect. Repeat CT maxillofacial and C-spine (11/29) were all stable with no acute change changes. CTA head/neck (11/29) were unremarkable. CT chest/abdomen/pelvis (11/29) showed symmetric enlargement and surrounding inflammation involving the right sternocleidomastoid muscle as well as infiltration of the subcutaneous fat overlying the right upper chest wall, likely representing the sequelae of recent trauma; nonspecific 1.7 cm nodular airspace opacity within the right upper lobe; no CT evidence of acute traumatic sequelae within the abdomen or pelvis; no acute thoracic spine fracture or evidence of traumatic malalignment.  Patient was also noted with elevated INR and thrombocytopenia, which are concerning for cirrhosis in the setting of alcohol abuse. Patient was started on CIWA phenobarbital taper.  Incidental findings were documented on 11/29. Tertiary exam was completed on 11/30  Patient was downgraded to surgical floor on 12/01  On 12/02, patient was anemic with Hgb at 6.6, and was given 1u prbc. CT head (12/02) showed stable left SDH. CT neck, chest, abd/pelvis with IV contrast (12/02) showed no hematoma or evidence of retroperitoneal bleeding.  On 12/02 overnight, RRT was called for elevated CIWA score of 21, and was given multiple rounds of Ativan. Patient was then transferred back to SICU for closer monitoring.   Patient was downgraded back to surgical floor on 12/05, and continued with CIWA phenobarbital taper  Facial sutures were removed at bedside on 12/06    Neurosurgery was consulted for the SDH. Neurosurgery recommended no acute neurosx intervention, and Keppra 500BID x 7 days for seizure ppx.     Ophthalmology was consulted for large left periorbital hematoma. Opthalmology recommended ice to left eye for the first 48 hours, HOB elevation, erythromycin ointment, left eye TID due to subconjunctival hemorrhage and irregular corneal wetting    Physical/occupational therapy evaluated the patient, and recommended acute rehab. Physical medicine and rehab also agreed with acute TBI rehab     On the day of discharge, patient's vital signs are within normal limits, pain is controlled, passing gas/stool, voiding well, tolerating a PO diet, and ambulating well. Patient will f/u with neurosurgery (Dr. Joyce) and their own ophthalmologist vs. [facility] Department of Opthalmology in 1-2 weeks after discharge from the hospital. Patient will f/u with their PCP in 1-2 weeks after discharge from the hospital Patient is a 60y year old male with PMHx of alcohol abuse (drinks 1p vodka/day), pancreatic CA s/p whipple procedure in 2015, depression and HTN who presents after a ground-level fall, he reports he hit his head on a bedside table and had + HS and + LOC.     Primary Survey  A - airway intact  B - bilateral breath sounds and good chest rise  C - initially BP: 135/78 (11-29-23 @ 07:15), palpable pulses in all extremities  D - GCS 15 on arrival  Exposure obtained    Secondary survey  Gen: NAD  HEENT: L periorbital ecchymosis/edema, R neck soft tissue edema  CV: s1, s2, RRR  Pulm: CTA B/L  Chest: No TTP  Abd: Soft, ND, NT, no rebound, no guarding  Groin: Normal appearing  Ext: Palp radial b/l, palp DP b/l  Back: no TTP, no palpable runoff, stepoff, or deformity    CT head and C-spine (11/28) showed an acute left parafalcine SDH without mass effect. CT maxillofacial (11/28) showed extensive left periorbital and facial soft tissue swelling/hematoma, but no acute maxillofacial bone or mandibular fracture; soft tissue swelling of Right sternocleidomastoid muscle.     Patient was admitted under Acute Care Surgery/Trauma Surgery for further evaluation and management. K-Centra was given for elevated INR. Patient was treated with multimodal pain control and incentive spirometry was encouraged. SICU was consulted for closer hemodynamic monitoring and alcohol withdrawal, and accepted the patient.   Repeat 6 hour CTH (11/29) showed stable left SDH with no significant mass effect. Repeat CT maxillofacial and C-spine (11/29) were all stable with no acute change changes. CTA head/neck (11/29) were unremarkable. CT chest/abdomen/pelvis (11/29) showed symmetric enlargement and surrounding inflammation involving the right sternocleidomastoid muscle as well as infiltration of the subcutaneous fat overlying the right upper chest wall, likely representing the sequelae of recent trauma; nonspecific 1.7 cm nodular airspace opacity within the right upper lobe; no CT evidence of acute traumatic sequelae within the abdomen or pelvis; no acute thoracic spine fracture or evidence of traumatic malalignment.  Patient was also noted with elevated INR and thrombocytopenia, which are concerning for cirrhosis in the setting of alcohol abuse. Patient was started on CIWA phenobarbital taper.  Incidental findings were documented on 11/29. Tertiary exam was completed on 11/30  Patient was downgraded to surgical floor on 12/01  On 12/02, patient was anemic with Hgb at 6.6, and was given 1u prbc. CT head (12/02) showed stable left SDH. CT neck, chest, abd/pelvis with IV contrast (12/02) showed no hematoma or evidence of retroperitoneal bleeding.  On 12/02 overnight, RRT was called for elevated CIWA score of 21, and was given multiple rounds of Ativan. Patient was then transferred back to SICU for closer monitoring.   Patient was downgraded back to surgical floor on 12/05, and continued with CIWA phenobarbital taper  Facial sutures were removed at bedside on 12/06    Neurosurgery was consulted for the SDH. Neurosurgery recommended no acute neurosx intervention, and Keppra 500BID x 7 days for seizure ppx.     Ophthalmology was consulted for large left periorbital hematoma. Opthalmology recommended ice to left eye for the first 48 hours, HOB elevation, erythromycin ointment, left eye TID due to subconjunctival hemorrhage and irregular corneal wetting    Physical/occupational therapy evaluated the patient, and recommended acute rehab. Physical medicine and rehab also agreed with acute TBI rehab     On the day of discharge, patient's vital signs are within normal limits, pain is controlled, passing gas/stool, voiding well, tolerating a PO diet, and ambulating well. Patient will f/u with neurosurgery (Dr. Joyce) and their own ophthalmologist vs. [facility] Department of Opthalmology in 1-2 weeks after discharge from the hospital. Patient will f/u with their PCP in 1-2 weeks after discharge from the hospital Patient is a 60y year old male with PMHx of alcohol abuse (drinks 1p vodka/day), pancreatic CA s/p whipple procedure in 2015, depression and HTN who presents after a ground-level fall, he reports he hit his head on a bedside table and had + HS and + LOC.     Primary Survey  A - airway intact  B - bilateral breath sounds and good chest rise  C - initially BP: 135/78 (11-29-23 @ 07:15), palpable pulses in all extremities  D - GCS 15 on arrival  Exposure obtained    Secondary survey  Gen: NAD  HEENT: L periorbital ecchymosis/edema, R neck soft tissue edema  CV: s1, s2, RRR  Pulm: CTA B/L  Chest: No TTP  Abd: Soft, ND, NT, no rebound, no guarding  Groin: Normal appearing  Ext: Palp radial b/l, palp DP b/l  Back: no TTP, no palpable runoff, stepoff, or deformity    CT head and C-spine (11/28) showed an acute left parafalcine SDH without mass effect. CT maxillofacial (11/28) showed extensive left periorbital and facial soft tissue swelling/hematoma, but no acute maxillofacial bone or mandibular fracture; soft tissue swelling of Right sternocleidomastoid muscle.     Patient was admitted under Acute Care Surgery/Trauma Surgery for further evaluation and management. K-Centra was given for elevated INR. Patient was treated with multimodal pain control and incentive spirometry was encouraged. SICU was consulted for closer hemodynamic monitoring and alcohol withdrawal, and accepted the patient.   Repeat 6 hour CTH (11/29) showed stable left SDH with no significant mass effect. Repeat CT maxillofacial and C-spine (11/29) were all stable with no acute change changes. CTA head/neck (11/29) were unremarkable. CT chest/abdomen/pelvis (11/29) showed symmetric enlargement and surrounding inflammation involving the right sternocleidomastoid muscle as well as infiltration of the subcutaneous fat overlying the right upper chest wall, likely representing the sequelae of recent trauma; nonspecific 1.7 cm nodular airspace opacity within the right upper lobe; no CT evidence of acute traumatic sequelae within the abdomen or pelvis; no acute thoracic spine fracture or evidence of traumatic malalignment.  Patient was also noted with elevated INR and thrombocytopenia, which are concerning for cirrhosis in the setting of alcohol abuse. Patient was started on CIWA phenobarbital taper.  Incidental findings were documented on 11/29. Tertiary exam was completed on 11/30  Patient was downgraded to surgical floor on 12/01  On 12/02, patient was anemic with Hgb at 6.6, and was given 1u prbc. CT head (12/02) showed stable left SDH. CT neck, chest, abd/pelvis with IV contrast (12/02) showed no hematoma or evidence of retroperitoneal bleeding.  On 12/02 overnight, RRT was called for elevated CIWA score of 21, and was given multiple rounds of Ativan. Patient was then transferred back to SICU for closer monitoring.   Patient was downgraded back to surgical floor on 12/05, and continued with CIWA phenobarbital taper  Facial sutures were removed at bedside on 12/06    Neurosurgery was consulted for the SDH. Neurosurgery recommended no acute neurosx intervention, and Keppra 500BID x 7 days for seizure ppx.     Ophthalmology was consulted for large left periorbital hematoma. Opthalmology recommended ice to left eye for the first 48 hours, HOB elevation, erythromycin ointment, left eye TID due to subconjunctival hemorrhage and irregular corneal wetting    Physical/occupational therapy evaluated the patient, and recommended acute rehab. Physical medicine and rehab also agreed with acute TBI rehab     On the day of discharge, patient's vital signs are within normal limits, pain is controlled, passing gas/stool, voiding well, tolerating a PO diet, and ambulating well. Patient will f/u with neurosurgery (Dr. Joyce) and their own ophthalmologist vs. [facility] Department of Opthalmology in 1-2 weeks after discharge from the hospital. Patient will f/u with their PCP in 1-2 weeks after discharge from the hospital

## 2025-08-06 ENCOUNTER — OFFICE VISIT (OUTPATIENT)
Dept: INTERNAL MEDICINE | Facility: CLINIC | Age: 35
End: 2025-08-06
Payer: COMMERCIAL

## 2025-08-06 VITALS
WEIGHT: 163 LBS | SYSTOLIC BLOOD PRESSURE: 112 MMHG | TEMPERATURE: 98 F | HEIGHT: 62 IN | BODY MASS INDEX: 30 KG/M2 | OXYGEN SATURATION: 98 % | HEART RATE: 61 BPM | RESPIRATION RATE: 14 BRPM | DIASTOLIC BLOOD PRESSURE: 76 MMHG

## 2025-08-06 DIAGNOSIS — R29.818 SUSPECTED SLEEP APNEA: Primary | ICD-10-CM

## 2025-08-06 DIAGNOSIS — K56.609 SBO (SMALL BOWEL OBSTRUCTION) (H): ICD-10-CM

## 2025-08-06 DIAGNOSIS — G47.00 INSOMNIA, UNSPECIFIED TYPE: ICD-10-CM

## 2025-08-07 ENCOUNTER — PATIENT OUTREACH (OUTPATIENT)
Dept: CARE COORDINATION | Facility: CLINIC | Age: 35
End: 2025-08-07
Payer: COMMERCIAL

## 2025-08-11 ENCOUNTER — PATIENT OUTREACH (OUTPATIENT)
Dept: CARE COORDINATION | Facility: CLINIC | Age: 35
End: 2025-08-11
Payer: COMMERCIAL

## (undated) DEVICE — ESU CORD MONOPOLAR 10'  E0510

## (undated) DEVICE — SOL RINGERS LACTATED 1000ML BAG 2B2324X

## (undated) DEVICE — CLIP LIGACLIP LG YELLOW LT400

## (undated) DEVICE — BASIN EMESIS STERILE  SSK9005A

## (undated) DEVICE — PREP CHLORAPREP 26ML TINTED HI-LITE ORANGE 930815

## (undated) DEVICE — ENDO TROCAR SLEEVE KII Z-THREADED 05X100MM CTS02

## (undated) DEVICE — ENDO POUCH UNIV RETRIEVAL SYSTEM INZII 10MM CD001

## (undated) DEVICE — SOL WATER IRRIG 1000ML BOTTLE 2F7114

## (undated) DEVICE — ENDO TROCAR FIRST ENTRY KII FIOS Z-THRD 05X100MM CTF03

## (undated) DEVICE — SUCTION MANIFOLD NEPTUNE 2 SYS 1 PORT 702-025-000

## (undated) DEVICE — VIAL DECANTER STERILE WHITE DYNJDEC06

## (undated) DEVICE — SU VICRYL+ 0 27 UR6 VLT VCP603H

## (undated) DEVICE — SOL NACL 0.9% IRRIG 1000ML BOTTLE 2F7124

## (undated) DEVICE — ELECTRODE PATIENT RETURN ADULT L10 FT 2 PLATE CORD 0855C

## (undated) DEVICE — NDL INSUFFLATION 13GA 120MM C2201

## (undated) DEVICE — SUCTION STRYKERFLOW II 250-070-500

## (undated) DEVICE — TUBING SMOKE EVAC PNEUMOCLEAR HIGH FLOW 0620050250

## (undated) DEVICE — ENDO TROCAR FIRST ENTRY KII FIOS Z-THRD 11X100MM CTF33

## (undated) DEVICE — ENDO SHEARS RENEW LAP ENDOCUT SCISSOR TIP 16.5MM 3142

## (undated) DEVICE — BLADE KNIFE SURG 11 371111

## (undated) DEVICE — GLOVE BIOGEL PI ORTHOPRO SZ 7.5 47675

## (undated) DEVICE — CUSTOM PACK LAP CHOLE SBA5BLCHEA

## (undated) DEVICE — BLADE CLIPPER DISP 4406

## (undated) DEVICE — SU MONOCRYL+ 4-0 18IN PS2 UND MCP496G

## (undated) RX ORDER — DEXAMETHASONE SODIUM PHOSPHATE 10 MG/ML
INJECTION, EMULSION INTRAMUSCULAR; INTRAVENOUS
Status: DISPENSED
Start: 2025-06-01

## (undated) RX ORDER — GLYCOPYRROLATE 0.2 MG/ML
INJECTION, SOLUTION INTRAMUSCULAR; INTRAVENOUS
Status: DISPENSED
Start: 2025-06-01

## (undated) RX ORDER — LIDOCAINE HYDROCHLORIDE 10 MG/ML
INJECTION, SOLUTION EPIDURAL; INFILTRATION; INTRACAUDAL; PERINEURAL
Status: DISPENSED
Start: 2025-06-01

## (undated) RX ORDER — EPHEDRINE SULFATE 50 MG/ML
INJECTION, SOLUTION INTRAMUSCULAR; INTRAVENOUS; SUBCUTANEOUS
Status: DISPENSED
Start: 2025-06-01

## (undated) RX ORDER — ONDANSETRON 2 MG/ML
INJECTION INTRAMUSCULAR; INTRAVENOUS
Status: DISPENSED
Start: 2025-06-01

## (undated) RX ORDER — CEFAZOLIN SODIUM 1 G/3ML
INJECTION, POWDER, FOR SOLUTION INTRAMUSCULAR; INTRAVENOUS
Status: DISPENSED
Start: 2025-06-01

## (undated) RX ORDER — PROPOFOL 10 MG/ML
INJECTION, EMULSION INTRAVENOUS
Status: DISPENSED
Start: 2025-06-01

## (undated) RX ORDER — FENTANYL CITRATE 50 UG/ML
INJECTION, SOLUTION INTRAMUSCULAR; INTRAVENOUS
Status: DISPENSED
Start: 2025-06-01